# Patient Record
Sex: FEMALE | Race: WHITE | NOT HISPANIC OR LATINO | Employment: OTHER | URBAN - METROPOLITAN AREA
[De-identification: names, ages, dates, MRNs, and addresses within clinical notes are randomized per-mention and may not be internally consistent; named-entity substitution may affect disease eponyms.]

---

## 2018-03-21 DIAGNOSIS — F41.9 ANXIETY: ICD-10-CM

## 2018-03-21 RX ORDER — DIAZEPAM 5 MG/1
5 TABLET ORAL DAILY
Qty: 15 TABLET | Refills: 0 | Status: SHIPPED | OUTPATIENT
Start: 2018-03-21 | End: 2018-04-30 | Stop reason: SDUPTHER

## 2018-03-21 RX ORDER — ESCITALOPRAM OXALATE 10 MG/1
10 TABLET ORAL DAILY
Qty: 30 TABLET | Refills: 2 | Status: SHIPPED | OUTPATIENT
Start: 2018-03-21 | End: 2018-06-19 | Stop reason: SDUPTHER

## 2018-04-30 DIAGNOSIS — F41.9 ANXIETY: ICD-10-CM

## 2018-04-30 RX ORDER — DIAZEPAM 5 MG/1
5 TABLET ORAL DAILY
Qty: 30 TABLET | Refills: 3 | Status: SHIPPED | OUTPATIENT
Start: 2018-04-30 | End: 2019-05-22 | Stop reason: SDUPTHER

## 2018-06-19 DIAGNOSIS — F41.9 ANXIETY: ICD-10-CM

## 2018-06-19 RX ORDER — ESCITALOPRAM OXALATE 10 MG/1
TABLET ORAL
Qty: 30 TABLET | Refills: 5 | Status: SHIPPED | OUTPATIENT
Start: 2018-06-19 | End: 2018-12-12 | Stop reason: SDUPTHER

## 2018-12-12 DIAGNOSIS — F41.9 ANXIETY: Primary | ICD-10-CM

## 2018-12-12 RX ORDER — BUPROPION HYDROCHLORIDE 150 MG/1
150 TABLET ORAL DAILY
Qty: 30 TABLET | Refills: 5 | Status: SHIPPED | OUTPATIENT
Start: 2018-12-12 | End: 2019-05-22 | Stop reason: ALTCHOICE

## 2018-12-12 RX ORDER — ESCITALOPRAM OXALATE 10 MG/1
10 TABLET ORAL DAILY
Qty: 30 TABLET | Refills: 5 | Status: SHIPPED | OUTPATIENT
Start: 2018-12-12 | End: 2019-05-22 | Stop reason: ALTCHOICE

## 2019-05-22 DIAGNOSIS — F41.9 ANXIETY: ICD-10-CM

## 2019-05-22 RX ORDER — DIAZEPAM 5 MG/1
5 TABLET ORAL DAILY
Qty: 30 TABLET | Refills: 3 | Status: SHIPPED | OUTPATIENT
Start: 2019-05-22 | End: 2020-11-02

## 2019-06-20 ENCOUNTER — OFFICE VISIT (OUTPATIENT)
Dept: INTERNAL MEDICINE | Facility: CLINIC | Age: 49
End: 2019-06-20
Payer: COMMERCIAL

## 2019-06-20 VITALS
HEART RATE: 68 BPM | TEMPERATURE: 99 F | SYSTOLIC BLOOD PRESSURE: 148 MMHG | HEIGHT: 63 IN | OXYGEN SATURATION: 96 % | DIASTOLIC BLOOD PRESSURE: 88 MMHG | BODY MASS INDEX: 34.96 KG/M2 | WEIGHT: 197.31 LBS

## 2019-06-20 DIAGNOSIS — G47.9 SLEEPING DIFFICULTY: ICD-10-CM

## 2019-06-20 DIAGNOSIS — Z00.00 ENCOUNTER FOR HEALTH MAINTENANCE EXAMINATION: ICD-10-CM

## 2019-06-20 DIAGNOSIS — I10 ESSENTIAL HYPERTENSION: Primary | ICD-10-CM

## 2019-06-20 DIAGNOSIS — R06.83 SNORING: ICD-10-CM

## 2019-06-20 DIAGNOSIS — Z83.3 FAMILY HISTORY OF DIABETES MELLITUS: ICD-10-CM

## 2019-06-20 PROCEDURE — 3079F DIAST BP 80-89 MM HG: CPT | Mod: CPTII,S$GLB,, | Performed by: INTERNAL MEDICINE

## 2019-06-20 PROCEDURE — 99203 OFFICE O/P NEW LOW 30 MIN: CPT | Mod: S$GLB,,, | Performed by: INTERNAL MEDICINE

## 2019-06-20 PROCEDURE — 3008F PR BODY MASS INDEX (BMI) DOCUMENTED: ICD-10-PCS | Mod: CPTII,S$GLB,, | Performed by: INTERNAL MEDICINE

## 2019-06-20 PROCEDURE — 3077F SYST BP >= 140 MM HG: CPT | Mod: CPTII,S$GLB,, | Performed by: INTERNAL MEDICINE

## 2019-06-20 PROCEDURE — 99203 PR OFFICE/OUTPT VISIT, NEW, LEVL III, 30-44 MIN: ICD-10-PCS | Mod: S$GLB,,, | Performed by: INTERNAL MEDICINE

## 2019-06-20 PROCEDURE — 3077F PR MOST RECENT SYSTOLIC BLOOD PRESSURE >= 140 MM HG: ICD-10-PCS | Mod: CPTII,S$GLB,, | Performed by: INTERNAL MEDICINE

## 2019-06-20 PROCEDURE — 99999 PR PBB SHADOW E&M-EST. PATIENT-LVL IV: CPT | Mod: PBBFAC,,, | Performed by: INTERNAL MEDICINE

## 2019-06-20 PROCEDURE — 3079F PR MOST RECENT DIASTOLIC BLOOD PRESSURE 80-89 MM HG: ICD-10-PCS | Mod: CPTII,S$GLB,, | Performed by: INTERNAL MEDICINE

## 2019-06-20 PROCEDURE — 99999 PR PBB SHADOW E&M-EST. PATIENT-LVL IV: ICD-10-PCS | Mod: PBBFAC,,, | Performed by: INTERNAL MEDICINE

## 2019-06-20 PROCEDURE — 3008F BODY MASS INDEX DOCD: CPT | Mod: CPTII,S$GLB,, | Performed by: INTERNAL MEDICINE

## 2019-06-20 RX ORDER — AMLODIPINE BESYLATE 5 MG/1
5 TABLET ORAL DAILY
Qty: 30 TABLET | Refills: 1 | Status: SHIPPED | OUTPATIENT
Start: 2019-06-20 | End: 2019-07-12 | Stop reason: SDUPTHER

## 2019-06-20 NOTE — Clinical Note
Dr Mo - I saw Ms Valenzuela for an establish-care visit today; we discussed her sleep difficulties and stress as well as the diazepam. So that you're aware, I don't prescribe diazepam or other benzodiazepines for sleep; I recommended that if she feels she needs to continue this that she see you or another psychiatrist. It wasn't clear whether she had seen you for her own care. Thanks,Clemente Ku MD

## 2019-06-20 NOTE — PROGRESS NOTES
"Subjective:       Patient ID: Gege Valenzuela is a 48 y.o. female.    Chief Complaint: Establish Care    Hypertension   This is a chronic problem. The current episode started more than 1 year ago. The problem has been waxing and waning since onset. The problem is resistant. Pertinent negatives include no anxiety, blurred vision, chest pain, headaches, malaise/fatigue, neck pain, orthopnea, palpitations, peripheral edema, PND, shortness of breath or sweats. There are no associated agents to hypertension. Risk factors for coronary artery disease include obesity and stress. Past treatments include lifestyle changes. The current treatment provides no improvement. There are no compliance problems.      47 y/o woman here to establish care, primary concern is high BP.  Has been following with her OB/Gyn (Dr Stephani Noriega) for primary care generally, hasn't had PCP. Last saw Dr Ham for urgent visit in 2012.    HTN - reports BP has been to some degree high when at Gyn office for past 5 years. Recently started monitoring at home - 140-160s/90-100s since February  No headache, vision changes, chest pain, or dyspnea.  Did have gestational HTN    Mother and uncle with HTN  Uncle with prinzmetal's angina which she reports is allergy-related    Cooks at home, generally healthy foods, doesn't cook with salt, doesn't add salt to foods.   Generally not eating fast foods. Watches sodium level.   1 diet coke daily    Stress, difficulty sleeping - Stress related to helping son manage DM1, other family health issues, managing day-to-day.   Has seen / been prescribed medications by a psychiatrist (also by her Gyn?). Was on lexapro, switched briefly to wellbutrin, had significant dizziness with this, switched back, then tapered off the lexapro. Takes diazepam 5mg for sleep sometimes, not daily. Doesn't want to take a "sleeping pill".   Last rx for diazepam on [facility]PP[facility] from 5/22/19 from Dr Mo for #30, prior to this was 9/2018, " 7/2018, 4/2018, 3/2018.  Does have a glass or two of wine in evenings sometimes, not daily    Gets sleepy easily, does snore, light sleeper / wakes easily at night.   Usually able to fall asleep easily, getting back to sleep after waking can be difficult    Weight generally stable over past decade  Working with  on exercise, would like to lose weight  Doesn't feel that she would be able to track calories or make significant diet changes - already generally healthy diet    Following with Dr Stephani Noriega for Gyn - Pap done in 3/14/2019, was abnormal, f/u with colpo in 5/6/2019 which was benign. Planned for repeat pap at 6 months  Mammogram done 4/3/2019, normal, does these yearly    Paternal GM with breast cancer later in life  Half-sister (maternal) who had breast cancer (but has FHx on father's side)  No colon cancer in family    Review of Systems   Constitutional: Positive for fatigue (sleepy easily during the day). Negative for activity change, malaise/fatigue and unexpected weight change.   HENT: Negative.    Eyes: Negative for blurred vision and visual disturbance.   Respiratory: Negative for cough and shortness of breath.    Cardiovascular: Negative for chest pain, palpitations, orthopnea, leg swelling and PND.   Gastrointestinal: Negative.  Negative for abdominal pain and blood in stool.   Endocrine: Positive for heat intolerance (feels hot all the time (not new)).   Genitourinary: Negative.    Musculoskeletal: Negative for gait problem, joint swelling and neck pain.   Skin: Negative.    Neurological: Negative for weakness and headaches.   Psychiatric/Behavioral: Positive for sleep disturbance.        Stress         Past Medical History:   Diagnosis Date    Abnormal Pap smear of cervix 03/2019    colpo benign    Gestational hypertension     HTN (hypertension) 2019     Past Surgical History:   Procedure Laterality Date    acl replacement      ANTERIOR CRUCIATE LIGAMENT REPAIR      ARTHROSCOPY-KNEE  "WITH MEDIAL AND LATERAL REPAIR Right 2016    [facility]    ARTHROSCOPY-MENISCECTOMY Right 2016    [facility]     SECTION      2    DILATION AND CURETTAGE OF UTERUS      REPAIR ANTERIOR CRUCIATE LIGAMENT-ARTHROSCOPICALLY AIDED BTB ALLOGRAFT / PRP AUGMENTATION Right 2016    [facility]    REPAIR-MENISCUS MEDIAL AND LATERAL REPAIR Right 2016    [facility]     Family History   Adopted: Yes   Problem Relation Age of Onset    Hypertension Mother     Hypertension Father     Diabetes Father         DM2    Chromosomal disorder Daughter     Diabetes Son         DM1    Cancer Sister         breast cancer    Heart disease Paternal Uncle         prinzmetal's angina    Diabetes Paternal Uncle         DM2    Hypertension Paternal Uncle     COPD Maternal Grandmother     Cancer Paternal Grandmother         breast cancer (dx when older)    Diabetes Paternal Grandmother         DM2    COPD Paternal Grandfather     Stroke Paternal Grandfather        Social History     Tobacco Use    Smoking status: Former Smoker     Packs/day: 1.00     Years: 19.00     Pack years: 19.00     Last attempt to quit: 2006     Years since quittin.7    Smokeless tobacco: Never Used   Substance Use Topics    Alcohol use: Yes     Alcohol/week: 2.4 oz     Types: 4 Glasses of wine per week     Frequency: 4 or more times a week     Drinks per session: 1 or 2     Binge frequency: Less than monthly     Comment: occasional    Drug use: No       Medications and allergies reviewed.     Objective:          Vitals:    19 1052 19 1319   BP: (!) 154/90 (!) 148/88   BP Location: Left arm    Patient Position: Sitting    Pulse: 68    Temp: 98.9 °F (37.2 °C)    TempSrc: Oral    SpO2: 96%    Weight: 89.5 kg (197 lb 5 oz)    Height: 5' 3" (1.6 m)      Body mass index is 34.95 " kg/m².  Physical Exam   Constitutional: She is oriented to person, place, and time. She appears well-developed and well-nourished. No distress.   HENT:   Head: Normocephalic and atraumatic.   Mouth/Throat: Oropharynx is clear and moist.   Eyes: Conjunctivae and EOM are normal.   Neck: Neck supple.   Cardiovascular: Normal rate, regular rhythm and normal heart sounds.   No murmur heard.  Pulmonary/Chest: Effort normal and breath sounds normal. No respiratory distress.   Abdominal: Soft.   Musculoskeletal: She exhibits no edema or tenderness.   Neurological: She is alert and oriented to person, place, and time. No cranial nerve deficit. Gait normal.   Skin: Skin is warm and dry.   Psychiatric: She has a normal mood and affect.   Vitals reviewed.      Lab Results   Component Value Date    WBC 8.56 05/18/2016    HGB 14.3 05/18/2016    HCT 42.8 05/18/2016     05/18/2016    CHOL 157 08/26/2009    TRIG 136 08/26/2009    HDL 49 08/26/2009    ALT 30 08/26/2009    AST 21 08/26/2009     08/26/2009    K 3.4 (L) 08/26/2009     08/26/2009    CREATININE 0.7 08/26/2009    BUN 8 08/26/2009    CO2 28 08/26/2009    TSH 1.89 08/26/2009       Assessment:       1. Essential hypertension    2. Encounter for health maintenance examination    3. Snoring    4. Sleeping difficulty    5. Family history of diabetes mellitus        Plan:   Gege was seen today for establish care.    Diagnoses and all orders for this visit:    Essential hypertension - consistently above goal in HTN range on home cuff and also at visit today  Already following low-salt diet  May have element of sleep problem/JAM contributing but not interested in further eval for sleep apnea at present  Continue working on regular exercise  Starting norvasc - can start with 2.5mg x 1 week, monitor at home, go up to 5mg if not consistently <130/80.  F/u with nurse visit for home cuff calibration in a few weeks  -     amLODIPine (NORVASC) 5 MG tablet; Take 1  tablet (5 mg total) by mouth once daily.    Encounter for health maintenance examination - labs before next visit  -     CBC Without Differential; Future  -     Comprehensive metabolic panel; Future  -     Lipid panel; Future  -     TSH; Future  -     Vitamin D; Future    Snoring  Sleeping difficulty - as noted re: snoring. Discussed that I do not prescribe diazepam as a medication for sleep, but that if she feels she needs an ongoing prescription for this she can follow up with psychiatrist. She is not interested in another medication at present    Family history of diabetes mellitus  -     Hemoglobin A1c; Future    Health maintenance reviewed with patient.   Labs today (if insurance covers labs here; she will check)  Record request from her Gyn  RN visit for home cuff check in a few weeks  Follow up in about 3 months (around 9/20/2019) for hypertension, annual physical.    Clemente Ku MD  Internal Medicine  [facility]  6/20/2019

## 2019-06-20 NOTE — PATIENT INSTRUCTIONS
Blood Pressure Measurement:    -- Please record your blood pressure 3-5 times per week. When checking, make sure you have been sitting for about 5 minutes, your legs are uncrossed, and the blood pressure cuff at the level of your heart. Record your blood pressure with the date and time in a log and bring it with you to every doctor's visit.  -- Goal blood pressure is top number less than 130 and bottom number less than 80.  -- If your blood pressure is >160/>100 on two consecutive occasions, contact the office. If it is >180/>120 and you are having confusion, chest pain or back pain, shortness of breath, or blood in the urine, go to the emergency room immediately.

## 2019-06-24 ENCOUNTER — PATIENT MESSAGE (OUTPATIENT)
Dept: INTERNAL MEDICINE | Facility: CLINIC | Age: 49
End: 2019-06-24

## 2019-07-02 ENCOUNTER — PATIENT MESSAGE (OUTPATIENT)
Dept: INTERNAL MEDICINE | Facility: CLINIC | Age: 49
End: 2019-07-02

## 2019-07-02 DIAGNOSIS — F41.9 ANXIETY: Primary | ICD-10-CM

## 2019-07-02 DIAGNOSIS — R06.83 SNORING: ICD-10-CM

## 2019-07-02 DIAGNOSIS — Z83.3 FAMILY HISTORY OF DIABETES MELLITUS: ICD-10-CM

## 2019-07-02 DIAGNOSIS — E66.9 OBESITY (BMI 30.0-34.9): ICD-10-CM

## 2019-07-02 DIAGNOSIS — G47.9 SLEEP DIFFICULTIES: ICD-10-CM

## 2019-07-02 DIAGNOSIS — I10 ESSENTIAL HYPERTENSION: ICD-10-CM

## 2019-07-03 PROBLEM — Z83.3 FAMILY HISTORY OF DIABETES MELLITUS: Status: ACTIVE | Noted: 2019-07-03

## 2019-07-03 PROBLEM — G47.9 SLEEP DIFFICULTIES: Status: ACTIVE | Noted: 2019-07-03

## 2019-07-03 PROBLEM — E66.9 OBESITY (BMI 30.0-34.9): Status: ACTIVE | Noted: 2019-07-03

## 2019-07-03 PROBLEM — E66.811 OBESITY (BMI 30.0-34.9): Status: ACTIVE | Noted: 2019-07-03

## 2019-07-03 PROBLEM — I10 ESSENTIAL HYPERTENSION: Status: ACTIVE | Noted: 2019-07-03

## 2019-07-03 NOTE — TELEPHONE ENCOUNTER
Labs: CBC, CMP, lipid, TSH, vitamin D, A1c  Orders sent to [facility]  Let patient know

## 2019-07-10 LAB
25(OH)D3 SERPL-MCNC: 24 NG/ML (ref 30–100)
ALBUMIN SERPL-MCNC: 4.2 G/DL (ref 3.6–5.1)
ALBUMIN/GLOB SERPL: 1.6 (CALC) (ref 1–2.5)
ALP SERPL-CCNC: 70 U/L (ref 33–115)
ALT SERPL-CCNC: 14 U/L (ref 6–29)
AST SERPL-CCNC: 15 U/L (ref 10–35)
BILIRUB SERPL-MCNC: 0.7 MG/DL (ref 0.2–1.2)
BUN SERPL-MCNC: 14 MG/DL (ref 7–25)
BUN/CREAT SERPL: ABNORMAL (CALC) (ref 6–22)
CALCIUM SERPL-MCNC: 9.5 MG/DL (ref 8.6–10.2)
CHLORIDE SERPL-SCNC: 102 MMOL/L (ref 98–110)
CHOLEST SERPL-MCNC: 152 MG/DL
CHOLEST/HDLC SERPL: 2.7 (CALC)
CO2 SERPL-SCNC: 27 MMOL/L (ref 20–32)
CREAT SERPL-MCNC: 0.62 MG/DL (ref 0.5–1.1)
ERYTHROCYTE [DISTWIDTH] IN BLOOD BY AUTOMATED COUNT: 15.4 % (ref 11–15)
GFRSERPLBLD MDRD-ARVRAT: 106 ML/MIN/1.73M2
GLOBULIN SER CALC-MCNC: 2.7 G/DL (CALC) (ref 1.9–3.7)
GLUCOSE SERPL-MCNC: 207 MG/DL (ref 65–99)
HBA1C MFR BLD: 6.7 % OF TOTAL HGB
HCT VFR BLD AUTO: 45.9 % (ref 35–45)
HDLC SERPL-MCNC: 56 MG/DL
HGB BLD-MCNC: 15.1 G/DL (ref 11.7–15.5)
LDLC SERPL CALC-MCNC: 75 MG/DL (CALC)
MCH RBC QN AUTO: 27 PG (ref 27–33)
MCHC RBC AUTO-ENTMCNC: 32.9 G/DL (ref 32–36)
MCV RBC AUTO: 82.1 FL (ref 80–100)
NONHDLC SERPL-MCNC: 96 MG/DL (CALC)
PLATELET # BLD AUTO: 266 THOUSAND/UL (ref 140–400)
PMV BLD REES-ECKER: 11.8 FL (ref 7.5–12.5)
POTASSIUM SERPL-SCNC: 4.4 MMOL/L (ref 3.5–5.3)
PROT SERPL-MCNC: 6.9 G/DL (ref 6.1–8.1)
RBC # BLD AUTO: 5.59 MILLION/UL (ref 3.8–5.1)
SODIUM SERPL-SCNC: 137 MMOL/L (ref 135–146)
TRIGL SERPL-MCNC: 127 MG/DL
TSH SERPL-ACNC: 1.71 MIU/L
WBC # BLD AUTO: 7.6 THOUSAND/UL (ref 3.8–10.8)

## 2019-07-11 ENCOUNTER — CLINICAL SUPPORT (OUTPATIENT)
Dept: INTERNAL MEDICINE | Facility: CLINIC | Age: 49
End: 2019-07-11
Payer: COMMERCIAL

## 2019-07-11 ENCOUNTER — TELEPHONE (OUTPATIENT)
Dept: INTERNAL MEDICINE | Facility: CLINIC | Age: 49
End: 2019-07-11

## 2019-07-11 VITALS — HEART RATE: 88 BPM | DIASTOLIC BLOOD PRESSURE: 82 MMHG | SYSTOLIC BLOOD PRESSURE: 140 MMHG

## 2019-07-11 DIAGNOSIS — R73.09 ELEVATED HEMOGLOBIN A1C: ICD-10-CM

## 2019-07-11 DIAGNOSIS — I10 ESSENTIAL HYPERTENSION: Primary | ICD-10-CM

## 2019-07-11 PROCEDURE — 99999 PR PBB SHADOW E&M-EST. PATIENT-LVL I: ICD-10-PCS | Mod: PBBFAC,,,

## 2019-07-11 PROCEDURE — 99999 PR PBB SHADOW E&M-EST. PATIENT-LVL I: CPT | Mod: PBBFAC,,,

## 2019-07-11 RX ORDER — LOSARTAN POTASSIUM 25 MG/1
25 TABLET ORAL DAILY
Qty: 90 TABLET | Refills: 3 | Status: SHIPPED | OUTPATIENT
Start: 2019-07-11 | End: 2019-07-12

## 2019-07-11 NOTE — PROGRESS NOTES
BP: 140/82     P:88  Machine BP: 145/96 I watched pt take BP on her machine, everything was done correctly    has rx Losartan. Pt is to continue taking Amlodipine 7.5mg  Pt has an appt with  tomorrow at 10:30am 07/12/19

## 2019-07-11 NOTE — TELEPHONE ENCOUNTER
Patient here for nurse BP check / home cuff check. Has been taking norvasc 7.5mg which has been keeping her blood pressure generally in the 140s/80-90s.    Labs done recently with normal TSH, CMP normal except high blood glucose >200, and A1c is 6.7; consistent with new diagnosis of diabetes though will recheck to verify and also schedule patient for close follow up to discuss further.  Given this, will add losartan 25mg for better BP control.    LPN will schedule patient for close follow up for her elevated A1c.

## 2019-07-12 ENCOUNTER — TELEPHONE (OUTPATIENT)
Dept: INTERNAL MEDICINE | Facility: CLINIC | Age: 49
End: 2019-07-12

## 2019-07-12 ENCOUNTER — OFFICE VISIT (OUTPATIENT)
Dept: INTERNAL MEDICINE | Facility: CLINIC | Age: 49
End: 2019-07-12
Payer: COMMERCIAL

## 2019-07-12 VITALS
WEIGHT: 197.56 LBS | TEMPERATURE: 99 F | BODY MASS INDEX: 35 KG/M2 | DIASTOLIC BLOOD PRESSURE: 84 MMHG | OXYGEN SATURATION: 99 % | SYSTOLIC BLOOD PRESSURE: 136 MMHG | HEIGHT: 63 IN | HEART RATE: 79 BPM

## 2019-07-12 DIAGNOSIS — Z83.3 FAMILY HISTORY OF DIABETES MELLITUS: ICD-10-CM

## 2019-07-12 DIAGNOSIS — R29.818 SUSPECTED SLEEP APNEA: ICD-10-CM

## 2019-07-12 DIAGNOSIS — E11.9 TYPE 2 DIABETES MELLITUS WITHOUT COMPLICATION, WITHOUT LONG-TERM CURRENT USE OF INSULIN: Primary | ICD-10-CM

## 2019-07-12 DIAGNOSIS — E55.9 VITAMIN D DEFICIENCY: ICD-10-CM

## 2019-07-12 DIAGNOSIS — I10 ESSENTIAL HYPERTENSION: ICD-10-CM

## 2019-07-12 LAB
ALBUMIN/CREAT UR: 16 UG/MG (ref 0–30)
CREAT UR-MCNC: 238 MG/DL (ref 15–325)
MICROALBUMIN UR DL<=1MG/L-MCNC: 38 UG/ML

## 2019-07-12 PROCEDURE — 99214 PR OFFICE/OUTPT VISIT, EST, LEVL IV, 30-39 MIN: ICD-10-PCS | Mod: S$GLB,,, | Performed by: INTERNAL MEDICINE

## 2019-07-12 PROCEDURE — 99214 OFFICE O/P EST MOD 30 MIN: CPT | Mod: S$GLB,,, | Performed by: INTERNAL MEDICINE

## 2019-07-12 PROCEDURE — 3044F PR MOST RECENT HEMOGLOBIN A1C LEVEL <7.0%: ICD-10-PCS | Mod: CPTII,S$GLB,, | Performed by: INTERNAL MEDICINE

## 2019-07-12 PROCEDURE — 82043 UR ALBUMIN QUANTITATIVE: CPT

## 2019-07-12 PROCEDURE — 3044F HG A1C LEVEL LT 7.0%: CPT | Mod: CPTII,S$GLB,, | Performed by: INTERNAL MEDICINE

## 2019-07-12 PROCEDURE — 3008F PR BODY MASS INDEX (BMI) DOCUMENTED: ICD-10-PCS | Mod: CPTII,S$GLB,, | Performed by: INTERNAL MEDICINE

## 2019-07-12 PROCEDURE — 3079F DIAST BP 80-89 MM HG: CPT | Mod: CPTII,S$GLB,, | Performed by: INTERNAL MEDICINE

## 2019-07-12 PROCEDURE — 3075F SYST BP GE 130 - 139MM HG: CPT | Mod: CPTII,S$GLB,, | Performed by: INTERNAL MEDICINE

## 2019-07-12 PROCEDURE — 99999 PR PBB SHADOW E&M-EST. PATIENT-LVL III: ICD-10-PCS | Mod: PBBFAC,,, | Performed by: INTERNAL MEDICINE

## 2019-07-12 PROCEDURE — 3075F PR MOST RECENT SYSTOLIC BLOOD PRESS GE 130-139MM HG: ICD-10-PCS | Mod: CPTII,S$GLB,, | Performed by: INTERNAL MEDICINE

## 2019-07-12 PROCEDURE — 3079F PR MOST RECENT DIASTOLIC BLOOD PRESSURE 80-89 MM HG: ICD-10-PCS | Mod: CPTII,S$GLB,, | Performed by: INTERNAL MEDICINE

## 2019-07-12 PROCEDURE — 3008F BODY MASS INDEX DOCD: CPT | Mod: CPTII,S$GLB,, | Performed by: INTERNAL MEDICINE

## 2019-07-12 PROCEDURE — 99999 PR PBB SHADOW E&M-EST. PATIENT-LVL III: CPT | Mod: PBBFAC,,, | Performed by: INTERNAL MEDICINE

## 2019-07-12 RX ORDER — AMLODIPINE BESYLATE 10 MG/1
10 TABLET ORAL DAILY
Qty: 30 TABLET | Refills: 3 | Status: SHIPPED | OUTPATIENT
Start: 2019-07-12 | End: 2019-11-20 | Stop reason: SDUPTHER

## 2019-07-12 RX ORDER — INSULIN PUMP SYRINGE, 3 ML
EACH MISCELLANEOUS
Qty: 1 EACH | Refills: 0 | Status: SHIPPED | OUTPATIENT
Start: 2019-07-12 | End: 2024-01-10

## 2019-07-12 RX ORDER — LANCETS
1 EACH MISCELLANEOUS 2 TIMES DAILY WITH MEALS
Qty: 100 EACH | Refills: 3 | Status: SHIPPED | OUTPATIENT
Start: 2019-07-12 | End: 2021-10-08 | Stop reason: SDUPTHER

## 2019-07-12 RX ORDER — LANCING DEVICE
1 EACH MISCELLANEOUS 2 TIMES DAILY WITH MEALS
Qty: 1 EACH | Refills: 0 | Status: SHIPPED | OUTPATIENT
Start: 2019-07-12 | End: 2021-10-08

## 2019-07-12 NOTE — Clinical Note
Philomena - this patient has new dx diabetes, says she has had eye exam recently with Dr Benton. Can you help get the record and make sure she was checked then for retinopathy?Thanks!Clemente Ku MD

## 2019-07-12 NOTE — PROGRESS NOTES
Subjective:       Patient ID: Gege Valenzuela is a 48 y.o. female.    Chief Complaint: Follow-up    HPI  47 y/o woman with HTN here for close follow up and lab review - new dx diabetes.  Recently established care    Labs 7/9 with BG >200 (nonfasting), A1c 6.7  Checked fasting BG this AM - 154  New diagnosis of diabetes. Feeling somewhat overwhelmed with this.  Would like to try to get strips to match a meter she already has at home if this is covered by insurance  Cares for son with DM1 - doesn't feel she would benefit from diabetes education class.   Would like to work on getting BG under better control with diet, exercise, weight loss rather than starting medication at present.    HTN - started on amlodipine 5mg at last visit. Doesn't want to start ARB if possible.    Suspected JAM - Has been sleeping propped up on pillows, feels that she is sleeping more deeply. No longer snoring as much with this.     Review of Systems   Constitutional: Positive for fatigue (sleepy easily during the day). Negative for activity change and unexpected weight change.   HENT: Negative.    Eyes: Negative for visual disturbance.   Respiratory: Negative for cough and shortness of breath.    Cardiovascular: Negative for chest pain and leg swelling.   Gastrointestinal: Negative.  Negative for abdominal pain and blood in stool.   Endocrine: Positive for heat intolerance (feels hot all the time (not new)). Negative for polyuria.   Genitourinary: Negative.    Musculoskeletal: Negative for gait problem and joint swelling.   Skin: Negative.    Neurological: Negative for weakness and headaches.   Psychiatric/Behavioral: Positive for sleep disturbance. The patient is nervous/anxious.         Stress         Past medical history, surgical history, and family medical history reviewed and updated as appropriate.    Medications and allergies reviewed.     Objective:          Vitals:    07/12/19 1014   BP: 136/84   BP Location: Left arm   Patient  "Position: Sitting   Pulse: 79   Temp: 99.3 °F (37.4 °C)   TempSrc: Oral   SpO2: 99%   Weight: 89.6 kg (197 lb 8.5 oz)   Height: 5' 3" (1.6 m)     Body mass index is 34.99 kg/m².  Physical Exam   Constitutional: She is oriented to person, place, and time. She appears well-developed and well-nourished. No distress.   HENT:   Head: Normocephalic and atraumatic.   Mouth/Throat: Oropharynx is clear and moist.   Eyes: Conjunctivae and EOM are normal.   Neck: Neck supple.   Cardiovascular: Normal rate, regular rhythm and normal heart sounds.   No murmur heard.  Pulmonary/Chest: Effort normal and breath sounds normal. No respiratory distress.   Abdominal: Soft.   Musculoskeletal: She exhibits no edema.   Neurological: She is alert and oriented to person, place, and time. No cranial nerve deficit. Gait normal.   Skin: Skin is warm and dry.   Psychiatric: She has a normal mood and affect.   Stress related to recent diagnosis   Vitals reviewed.    Protective Sensation (w/ 10 gram monofilament):  Right: Intact  Left: Intact    Visual Inspection:  Normal -  Bilateral    Pedal Pulses:   Right: Present  Left: Present    Posterior tibialis:   Right:Present  Left: Present      Lab Results   Component Value Date    WBC 7.6 07/09/2019    HGB 15.1 07/09/2019    HCT 45.9 (H) 07/09/2019     07/09/2019    CHOL 152 07/09/2019    TRIG 127 07/09/2019    HDL 56 07/09/2019    ALT 14 07/09/2019    AST 15 07/09/2019     07/09/2019    K 4.4 07/09/2019     07/09/2019    CREATININE 0.62 07/09/2019    BUN 14 07/09/2019    CO2 27 07/09/2019    TSH 1.71 07/09/2019    HGBA1C 6.7 (H) 07/09/2019       Assessment:       1. Type 2 diabetes mellitus without complication, without long-term current use of insulin    2. Family history of diabetes mellitus    3. Essential hypertension    4. Vitamin D deficiency    5. Suspected sleep apnea        Plan:   Gege was seen today for follow-up.    Diagnoses and all orders for this visit:    Type 2 " diabetes mellitus without complication, without long-term current use of insulin - reviewed diagnosis, goals for control of diabetes  Discussed options - metformin + diet/lifestyle change vs starting with diet/lifestyle change first. She wants to try getting this under control without medication for now.  Rx given for testing supplies  Checking MA/C  -     CBC Without Differential; Future  -     Basic metabolic panel; Future  -     Hemoglobin A1c; Future  -     CBC Without Differential  -     Basic metabolic panel  -     Hemoglobin A1c  -     blood-glucose meter kit; Use as instructed. Check once daily  -     blood sugar diagnostic Strp; 1 strip by Misc.(Non-Drug; Combo Route) route 2 (two) times daily with meals.  -     lancing device Misc; 1 Device by Misc.(Non-Drug; Combo Route) route 2 (two) times daily with meals.  -     lancets Misc; 1 lancet by Misc.(Non-Drug; Combo Route) route 2 (two) times daily with meals.  -     Microalbumin/creatinine urine ratio; Future  -     MICROALBUMIN / CREATININE RATIO URINE    Family history of diabetes mellitus    Essential hypertension - norvasc up to 10mg, BP improved  Recommended trial of switch to ARB; she does not want to do this out of concern for adverse effects  Does feel that norvasc increases frequent movements during sleep but overall is able to tolerate this  -     amLODIPine (NORVASC) 10 MG tablet; Take 1 tablet (10 mg total) by mouth once daily.  -     Basic metabolic panel; Future  -     Basic metabolic panel    Vitamin D deficiency - recommend 1000-2000IU daily  -     Vitamin D; Future  -     Vitamin D    Suspected sleep apnea - reviewed; given symptoms recommend sleep study which she defers for now. She is working on weight loss as well as ergonomic changes for sleep position    Health maintenance reviewed with patient.   Record from last eye exam requested  Urine test today  Other labs in 3 months at outside lab per her request  Follow up in about 3 months  (around 10/12/2019) for diabetes, hypertension.    Clemente Ku MD  Internal Medicine  [facility]  7/12/2019

## 2019-07-12 NOTE — TELEPHONE ENCOUNTER
----- Message from Odalys Zurita sent at 7/12/2019 11:52 AM CDT -----  Patient joy johnsrobert [phone], urine needs the collection step to be done in [facility].  Please advise   Lab [id number]

## 2019-07-12 NOTE — TELEPHONE ENCOUNTER
----- Message from Shaji Martinez sent at 7/12/2019  3:48 PM CDT -----  Regarding: urine orders  We have a urine in lab for patient Gege Valenzuela ([id number]) with invalid orders. If correct orders aren't placed today we will be unable to run this patient's test and the urine will have to be recollected.

## 2019-07-16 PROBLEM — R29.818 SUSPECTED SLEEP APNEA: Status: ACTIVE | Noted: 2019-07-03

## 2019-08-26 DIAGNOSIS — Z12.11 COLON CANCER SCREENING: ICD-10-CM

## 2019-09-23 ENCOUNTER — PATIENT MESSAGE (OUTPATIENT)
Dept: INTERNAL MEDICINE | Facility: CLINIC | Age: 49
End: 2019-09-23

## 2019-09-23 DIAGNOSIS — E11.9 TYPE 2 DIABETES MELLITUS WITHOUT COMPLICATION, WITHOUT LONG-TERM CURRENT USE OF INSULIN: Primary | ICD-10-CM

## 2019-09-24 PROBLEM — E11.9 TYPE 2 DIABETES MELLITUS WITHOUT COMPLICATION, WITHOUT LONG-TERM CURRENT USE OF INSULIN: Status: ACTIVE | Noted: 2019-09-24

## 2019-09-24 NOTE — TELEPHONE ENCOUNTER
The orders that are currently in for [facility] are CBC, BMP, A1c, Vitamin D.   There are two orders that have been processed in through bulk order and thus went to [facility] rather than [facility] (FIT and microalbumin/creatinine). Will re-order the microalbumin/creatinine urine test through [facility].  We will check in re: colon cancer screening at her visit.    Please let her know.

## 2019-09-26 LAB
ALBUMIN/CREAT UR: 22 MCG/MG CREAT
CREAT UR-MCNC: 104 MG/DL (ref 20–275)
MICROALBUMIN UR-MCNC: 2.3 MG/DL

## 2019-10-02 ENCOUNTER — OFFICE VISIT (OUTPATIENT)
Dept: INTERNAL MEDICINE | Facility: CLINIC | Age: 49
End: 2019-10-02
Payer: COMMERCIAL

## 2019-10-02 VITALS
SYSTOLIC BLOOD PRESSURE: 144 MMHG | TEMPERATURE: 98 F | BODY MASS INDEX: 34.25 KG/M2 | HEART RATE: 77 BPM | DIASTOLIC BLOOD PRESSURE: 78 MMHG | WEIGHT: 193.31 LBS | HEIGHT: 63 IN | OXYGEN SATURATION: 95 %

## 2019-10-02 DIAGNOSIS — E11.9 TYPE 2 DIABETES MELLITUS WITHOUT COMPLICATION, WITHOUT LONG-TERM CURRENT USE OF INSULIN: Primary | ICD-10-CM

## 2019-10-02 DIAGNOSIS — F41.9 ANXIETY: ICD-10-CM

## 2019-10-02 PROCEDURE — 99999 PR PBB SHADOW E&M-EST. PATIENT-LVL III: ICD-10-PCS | Mod: PBBFAC,,, | Performed by: INTERNAL MEDICINE

## 2019-10-02 PROCEDURE — 99499 NO LOS: ICD-10-PCS | Mod: S$GLB,,, | Performed by: INTERNAL MEDICINE

## 2019-10-02 PROCEDURE — 99499 UNLISTED E&M SERVICE: CPT | Mod: S$GLB,,, | Performed by: INTERNAL MEDICINE

## 2019-10-02 PROCEDURE — 99999 PR PBB SHADOW E&M-EST. PATIENT-LVL III: CPT | Mod: PBBFAC,,, | Performed by: INTERNAL MEDICINE

## 2019-10-14 ENCOUNTER — PATIENT MESSAGE (OUTPATIENT)
Dept: INTERNAL MEDICINE | Facility: CLINIC | Age: 49
End: 2019-10-14

## 2019-10-14 DIAGNOSIS — E13.9 DIABETES MELLITUS DUE TO ABNORMAL INSULIN: Primary | ICD-10-CM

## 2019-10-28 ENCOUNTER — PATIENT OUTREACH (OUTPATIENT)
Dept: ADMINISTRATIVE | Facility: HOSPITAL | Age: 49
End: 2019-10-28

## 2019-10-28 DIAGNOSIS — F41.9 ANXIETY: Primary | ICD-10-CM

## 2019-10-28 RX ORDER — ESCITALOPRAM OXALATE 10 MG/1
TABLET ORAL
Refills: 2 | COMMUNITY
Start: 2019-08-29 | End: 2019-10-28 | Stop reason: SDUPTHER

## 2019-10-28 RX ORDER — ESCITALOPRAM OXALATE 5 MG/1
5 TABLET ORAL DAILY
Qty: 30 TABLET | Refills: 11 | Status: SHIPPED | OUTPATIENT
Start: 2019-10-28 | End: 2020-06-12 | Stop reason: SDUPTHER

## 2019-11-08 LAB — HBA1C MFR BLD: 6.6 % OF TOTAL HGB

## 2019-11-09 NOTE — PROGRESS NOTES
Chief Complaint: Follow up of blood pressure, blood sugar, mood disorder    HPI:This is a 49 year old woman who presents for follow up of above. She has been taking amlodipine 10 mg daily in the evening.  No leg swelling, chest pain, shortness of breath.   She started on BP medication in . She does not check her BP regularly at home.      She continues to be tired. She does not sleep well.  She falls asleep without a problem. Once she is awake she will toss and turn.  She will take diazepam 5 mg at bedtime if she knows she cannot sleep (very infrequent). She has restless legs that keeps her awake at night.     She started on lexapro 3/2018. She was at 10 mg daily and was able to lower to 5 mg daily with control of her mood.     Periods are ligther - she had a uterine fibroid embolization several years ago due to heavy periods.     She had a colonoscopy at [facility] 3-5 years ago due to hemorrhoid issues.     She is working on diet and exercise for her blood sugars- has lost 6 pounds.     REVIEW OF SYSTEMS: She denies fevers, chills, night sweats, visual change, hearing loss, sinus congestion, sore throat, chest pain, shortness of breath, nausea, vomiting, constipation, diarrhea, dysuria, hematuria, polydipsia, polyuria, joint pain, muscle pain, headaches,     Nearsighteded.      Hot at night    Past Medical History:   Diagnosis Date    Abnormal Pap smear of cervix 2019    colpo benign    Gestational hypertension     HTN (hypertension)      Past Surgical History:   Procedure Laterality Date    acl replacement Right     x 2    ANTERIOR CRUCIATE LIGAMENT REPAIR Right      SECTION      2    COLPOSCOPY  2019    Stephani Noriega MD    DILATION AND CURETTAGE OF UTERUS      UTERINE FIBROID EMBOLIZATION       Social History     Socioeconomic History    Marital status:      Spouse name: Not on file    Number of children: Not on file    Years of education: Not on file    Highest  education level: Not on file   Occupational History    Not on file   Social Needs    Financial resource strain: Hard    Food insecurity:     Worry: Sometimes true     Inability: Sometimes true    Transportation needs:     Medical: No     Non-medical: No   Tobacco Use    Smoking status: Former Smoker     Packs/day: 1.00     Years: 19.00     Pack years: 19.00     Last attempt to quit: 2006     Years since quittin.1    Smokeless tobacco: Never Used   Substance and Sexual Activity    Alcohol use: Yes     Alcohol/week: 4.0 standard drinks     Types: 4 Glasses of wine per week     Comment: one drink 5 times a week    Drug use: No    Sexual activity: Yes     Partners: Male     Comment: , 2 kids   Lifestyle    Physical activity:     Days per week: 2 days     Minutes per session: 40 min    Stress: Very much   Relationships    Social connections:     Talks on phone: Three times a week     Gets together: Once a week     Attends Christian service: Not on file     Active member of club or organization: Yes     Attends meetings of clubs or organizations: More than 4 times per year     Relationship status:    Other Topics Concern    Not on file   Social History Narrative    Lives with  and children. Son with DM1, daughter with chromasomal disorder.    Working with  on increasing exercise    Generally very healthy diet, low-carb    Adopted         Family Status   Relation Name Status    Mother Rosemary Aguilar Alive    Father Curtis Trotter Alive    Brother half sib Alive    Son Vikas Alive    Sister half sib Alive    MGM Eden Aguilar (Not Specified)    PGM Corine Trotter (Not Specified)    PGF Bubba Trotter (Not Specified)    Daughter Amanda Alive    Pat Uncle Alejandro Trotter (Not Specified)         Meds and allergies: updated on EPIC    BP (!) 144/90 (BP Location: Right arm, Patient Position: Sitting, BP Method: Large (Manual))   Pulse 68   Temp 98.2  "°F (36.8 °C) (Oral)   Ht 5' 3" (1.6 m)   Wt 87.9 kg (193 lb 12.6 oz)   LMP 11/02/2019   SpO2 98%   BMI 34.33 kg/m²     General: alert, oriented x 3, no apparent distress.  Affect normal  HEENT: Conjunctivae: anicteric, PERRL, EOMI, TM clear, Oralpharynx clear  Neck: supple, no thyroid enlargement, no cervical lymphadenopathy  Resp: effort normal, lungs clear bilaterally  CV: Regular rate and rhythm without murmurs, gallops or rubs, no lower extremity edema,     Assessment/Plan:  1. Hypertension - does not want more medication. Discussed lifestyle modification. She will monitor her BP at home  2. Elevated blood sugars - working on diet and exercise and weight loss.   3. Mood disorder- stable on lexapro 5 mg daily  4. Insomnia and restless legs- try vitron C one tablet daily. Could be iron deficient. Try Co Enzyme Q 10 200 mg daily. Discussed diet tonic water  5. Obesity - discussed diet and exercise.   I will see her back in 4 months, Sooner if problems arise  "

## 2019-11-11 ENCOUNTER — OFFICE VISIT (OUTPATIENT)
Dept: INTERNAL MEDICINE | Facility: CLINIC | Age: 49
End: 2019-11-11
Payer: COMMERCIAL

## 2019-11-11 VITALS
OXYGEN SATURATION: 98 % | SYSTOLIC BLOOD PRESSURE: 144 MMHG | TEMPERATURE: 98 F | HEIGHT: 63 IN | WEIGHT: 193.81 LBS | DIASTOLIC BLOOD PRESSURE: 90 MMHG | HEART RATE: 68 BPM | BODY MASS INDEX: 34.34 KG/M2

## 2019-11-11 DIAGNOSIS — I10 ESSENTIAL HYPERTENSION: Primary | ICD-10-CM

## 2019-11-11 DIAGNOSIS — R73.9 ELEVATED BLOOD SUGAR: ICD-10-CM

## 2019-11-11 DIAGNOSIS — F41.9 ANXIETY: ICD-10-CM

## 2019-11-11 DIAGNOSIS — E66.9 OBESITY (BMI 30.0-34.9): ICD-10-CM

## 2019-11-11 PROBLEM — E11.9 TYPE 2 DIABETES MELLITUS WITHOUT COMPLICATION, WITHOUT LONG-TERM CURRENT USE OF INSULIN: Status: RESOLVED | Noted: 2019-09-24 | Resolved: 2019-11-11

## 2019-11-11 PROCEDURE — 99214 OFFICE O/P EST MOD 30 MIN: CPT | Mod: S$GLB,,, | Performed by: INTERNAL MEDICINE

## 2019-11-11 PROCEDURE — 99999 PR PBB SHADOW E&M-EST. PATIENT-LVL III: CPT | Mod: PBBFAC,,, | Performed by: INTERNAL MEDICINE

## 2019-11-11 PROCEDURE — 3008F PR BODY MASS INDEX (BMI) DOCUMENTED: ICD-10-PCS | Mod: CPTII,S$GLB,, | Performed by: INTERNAL MEDICINE

## 2019-11-11 PROCEDURE — 99214 PR OFFICE/OUTPT VISIT, EST, LEVL IV, 30-39 MIN: ICD-10-PCS | Mod: S$GLB,,, | Performed by: INTERNAL MEDICINE

## 2019-11-11 PROCEDURE — 3077F PR MOST RECENT SYSTOLIC BLOOD PRESSURE >= 140 MM HG: ICD-10-PCS | Mod: CPTII,S$GLB,, | Performed by: INTERNAL MEDICINE

## 2019-11-11 PROCEDURE — 3080F PR MOST RECENT DIASTOLIC BLOOD PRESSURE >= 90 MM HG: ICD-10-PCS | Mod: CPTII,S$GLB,, | Performed by: INTERNAL MEDICINE

## 2019-11-11 PROCEDURE — 3077F SYST BP >= 140 MM HG: CPT | Mod: CPTII,S$GLB,, | Performed by: INTERNAL MEDICINE

## 2019-11-11 PROCEDURE — 3008F BODY MASS INDEX DOCD: CPT | Mod: CPTII,S$GLB,, | Performed by: INTERNAL MEDICINE

## 2019-11-11 PROCEDURE — 3080F DIAST BP >= 90 MM HG: CPT | Mod: CPTII,S$GLB,, | Performed by: INTERNAL MEDICINE

## 2019-11-11 PROCEDURE — 99999 PR PBB SHADOW E&M-EST. PATIENT-LVL III: ICD-10-PCS | Mod: PBBFAC,,, | Performed by: INTERNAL MEDICINE

## 2019-11-20 DIAGNOSIS — I10 ESSENTIAL HYPERTENSION: ICD-10-CM

## 2019-11-20 RX ORDER — AMLODIPINE BESYLATE 10 MG/1
TABLET ORAL
Qty: 30 TABLET | Refills: 11 | Status: SHIPPED | OUTPATIENT
Start: 2019-11-20 | End: 2020-12-22

## 2020-02-21 DIAGNOSIS — E11.9 TYPE 2 DIABETES MELLITUS WITHOUT COMPLICATION, UNSPECIFIED WHETHER LONG TERM INSULIN USE: ICD-10-CM

## 2020-02-26 ENCOUNTER — OFFICE VISIT (OUTPATIENT)
Dept: URGENT CARE | Facility: CLINIC | Age: 50
End: 2020-02-26
Payer: COMMERCIAL

## 2020-02-26 VITALS
TEMPERATURE: 99 F | OXYGEN SATURATION: 95 % | HEART RATE: 82 BPM | SYSTOLIC BLOOD PRESSURE: 123 MMHG | BODY MASS INDEX: 34.2 KG/M2 | HEIGHT: 63 IN | DIASTOLIC BLOOD PRESSURE: 79 MMHG | WEIGHT: 193 LBS

## 2020-02-26 DIAGNOSIS — M79.10 MUSCLE ACHE: ICD-10-CM

## 2020-02-26 DIAGNOSIS — J02.9 SORE THROAT: ICD-10-CM

## 2020-02-26 DIAGNOSIS — J10.1 INFLUENZA A: Primary | ICD-10-CM

## 2020-02-26 LAB
CTP QC/QA: YES
CTP QC/QA: YES
FLUAV AG NPH QL: POSITIVE
FLUBV AG NPH QL: NEGATIVE
MOLECULAR STREP A: NEGATIVE

## 2020-02-26 PROCEDURE — 87804 INFLUENZA ASSAY W/OPTIC: CPT | Mod: QW,S$GLB,, | Performed by: NURSE PRACTITIONER

## 2020-02-26 PROCEDURE — 87651 STREP A DNA AMP PROBE: CPT | Mod: QW,S$GLB,, | Performed by: NURSE PRACTITIONER

## 2020-02-26 PROCEDURE — 99214 OFFICE O/P EST MOD 30 MIN: CPT | Mod: 25,S$GLB,, | Performed by: NURSE PRACTITIONER

## 2020-02-26 PROCEDURE — 99214 PR OFFICE/OUTPT VISIT, EST, LEVL IV, 30-39 MIN: ICD-10-PCS | Mod: 25,S$GLB,, | Performed by: NURSE PRACTITIONER

## 2020-02-26 PROCEDURE — 87804 POCT INFLUENZA A/B: ICD-10-PCS | Mod: QW,S$GLB,, | Performed by: NURSE PRACTITIONER

## 2020-02-26 PROCEDURE — 87651 POCT STREP A MOLECULAR: ICD-10-PCS | Mod: QW,S$GLB,, | Performed by: NURSE PRACTITIONER

## 2020-02-26 RX ORDER — OSELTAMIVIR PHOSPHATE 75 MG/1
75 CAPSULE ORAL 2 TIMES DAILY
Qty: 10 CAPSULE | Refills: 0 | Status: SHIPPED | OUTPATIENT
Start: 2020-02-26 | End: 2020-03-02

## 2020-02-26 NOTE — PROGRESS NOTES
"Subjective:       Patient ID: Gege Valenzuela is a 49 y.o. female.    Vitals:  height is 5' 3" (1.6 m) and weight is 87.5 kg (193 lb). Her oral temperature is 99 °F (37.2 °C). Her blood pressure is 123/79 and her pulse is 82. Her oxygen saturation is 95%.     Chief Complaint: URI    URI    This is a new problem. The current episode started in the past 7 days (Monday). The problem has been gradually worsening. The maximum temperature recorded prior to her arrival was 102 - 102.9 F. The fever has been present for 3 to 4 days. Associated symptoms include congestion, coughing, headaches, rhinorrhea and a sore throat. Pertinent negatives include no abdominal pain, chest pain, diarrhea, dysuria, ear pain, joint pain, joint swelling, nausea, neck pain, plugged ear sensation, rash, sinus pain, sneezing, swollen glands, vomiting or wheezing. She has tried NSAIDs (Prescription cough medication) for the symptoms. The treatment provided mild relief.       Constitution: Negative for chills, sweating, fatigue and fever.   HENT: Positive for congestion and sore throat. Negative for ear pain, sinus pain, sinus pressure and voice change.    Neck: Negative for neck pain and painful lymph nodes.   Cardiovascular: Negative for chest pain.   Eyes: Negative for eye redness.   Respiratory: Positive for cough. Negative for chest tightness, sputum production, bloody sputum, COPD, shortness of breath, stridor, wheezing and asthma.    Gastrointestinal: Negative for abdominal pain, nausea, vomiting and diarrhea.   Genitourinary: Negative for dysuria.   Musculoskeletal: Negative for muscle ache.   Skin: Negative for rash.   Allergic/Immunologic: Negative for seasonal allergies, asthma and sneezing.   Neurological: Positive for headaches.   Hematologic/Lymphatic: Negative for swollen lymph nodes.       Objective:      Physical Exam   Constitutional: She is oriented to person, place, and time. Vital signs are normal. She appears well-developed " and well-nourished. She is cooperative.  Non-toxic appearance. She does not have a sickly appearance. She does not appear ill. No distress.   HENT:   Head: Normocephalic and atraumatic.   Right Ear: Hearing, tympanic membrane, external ear and ear canal normal.   Left Ear: Hearing, tympanic membrane, external ear and ear canal normal.   Nose: Nose normal. No mucosal edema, rhinorrhea or nasal deformity. No epistaxis. Right sinus exhibits no maxillary sinus tenderness and no frontal sinus tenderness. Left sinus exhibits no maxillary sinus tenderness and no frontal sinus tenderness.   Mouth/Throat: Uvula is midline, oropharynx is clear and moist and mucous membranes are normal. No trismus in the jaw. Normal dentition. No uvula swelling. No oropharyngeal exudate, posterior oropharyngeal edema or posterior oropharyngeal erythema.   Erythematous pharynx, no exudate, no lesion, uvula midline.   Eyes: Conjunctivae and lids are normal. No scleral icterus.   Neck: Trachea normal, full passive range of motion without pain and phonation normal. Neck supple. No neck rigidity. No edema and no erythema present.   Cardiovascular: Normal rate, regular rhythm, normal heart sounds, intact distal pulses and normal pulses.   Pulmonary/Chest: Effort normal and breath sounds normal. No respiratory distress. She has no decreased breath sounds. She has no rhonchi.   Abdominal: Soft. Normal appearance and bowel sounds are normal. She exhibits no distension and no mass. There is no tenderness. There is no rebound and no guarding. No hernia.   Muscle aches around abdomen   Musculoskeletal: Normal range of motion. She exhibits no edema or deformity.   Neurological: She is alert and oriented to person, place, and time. She exhibits normal muscle tone. Coordination normal.   Skin: Skin is warm, dry, intact, not diaphoretic and not pale.   Psychiatric: She has a normal mood and affect. Her speech is normal and behavior is normal. Judgment and  thought content normal. Cognition and memory are normal.   Nursing note and vitals reviewed.        Results for orders placed or performed in visit on 02/26/20   POCT Strep A, Molecular   Result Value Ref Range    Molecular Strep A, POC Negative Negative     Acceptable Yes    POCT Influenza A/B   Result Value Ref Range    Rapid Influenza A Ag Positive (A) Negative    Rapid Influenza B Ag Negative Negative     Acceptable Yes      Assessment:       1. Influenza A    2. Sore throat    3. Muscle ache        Plan:       Reviewed and discussed influenza results.   Influenza A  -     oseltamivir (TAMIFLU) 75 MG capsule; Take 1 capsule (75 mg total) by mouth 2 (two) times daily. for 5 days  Dispense: 10 capsule; Refill: 0    Sore throat  -     POCT Strep A, Molecular    Muscle ache  -     POCT Influenza A/B          Patient Instructions   You have been diagnosed with Influenza.   You are contagious for 24 hours after your last fever.  Please drink plenty of fluids.  Please get plenty of rest.  Please return here or go to the Emergency Department for any concerns or worsening of condition.  Tamiflu prescription has been discussed and if prescribed, please take to completion unless you cannot tolerate the side effects.   If you were prescribed a narcotic medication, do not drive or operate heavy equipment or machinery while taking these medications.  If you were given a steroid shot in the clinic and have also been given a prescription for a steroid such as Prednisone or a Medrol Dose Pack, please begin taking them tomorrow.  Take tylenol (acetominophen) for fever, chills or body aches every 4 hours. do not exceed 4000 mg/ day.  Take Motrin (Ibuprofen) every 4 hours for fever, chills, pain or inflammation.  Use an antihistmine such as claritin or zyrtec to dry you out. Use pseudoephedrine (behind the counter) to decongest (beware this can raise your blood pressure). Use mucinex (guaifenisin) to  break up mucous

## 2020-02-26 NOTE — PATIENT INSTRUCTIONS
You have been diagnosed with Influenza.   You are contagious for 24 hours after your last fever.  Please drink plenty of fluids.  Please get plenty of rest.  Please return here or go to the Emergency Department for any concerns or worsening of condition.  Tamiflu prescription has been discussed and if prescribed, please take to completion unless you cannot tolerate the side effects.   If you were prescribed a narcotic medication, do not drive or operate heavy equipment or machinery while taking these medications.  If you were given a steroid shot in the clinic and have also been given a prescription for a steroid such as Prednisone or a Medrol Dose Pack, please begin taking them tomorrow.  Take tylenol (acetominophen) for fever, chills or body aches every 4 hours. do not exceed 4000 mg/ day.  Take Motrin (Ibuprofen) every 4 hours for fever, chills, pain or inflammation.  Use an antihistmine such as claritin or zyrtec to dry you out. Use pseudoephedrine (behind the counter) to decongest (beware this can raise your blood pressure). Use mucinex (guaifenisin) to break up mucous

## 2020-02-27 ENCOUNTER — PATIENT OUTREACH (OUTPATIENT)
Dept: ADMINISTRATIVE | Facility: HOSPITAL | Age: 50
End: 2020-02-27

## 2020-03-09 ENCOUNTER — PATIENT MESSAGE (OUTPATIENT)
Dept: INTERNAL MEDICINE | Facility: CLINIC | Age: 50
End: 2020-03-09

## 2020-03-09 DIAGNOSIS — E13.9 DIABETES MELLITUS DUE TO ABNORMAL INSULIN: Primary | ICD-10-CM

## 2020-03-12 LAB
ALBUMIN SERPL-MCNC: 4.1 G/DL (ref 3.6–5.1)
ALBUMIN/GLOB SERPL: 1.9 (CALC) (ref 1–2.5)
ALP SERPL-CCNC: 74 U/L (ref 37–153)
ALT SERPL-CCNC: 19 U/L (ref 6–29)
AST SERPL-CCNC: 18 U/L (ref 10–35)
BILIRUB SERPL-MCNC: 0.7 MG/DL (ref 0.2–1.2)
BUN SERPL-MCNC: 12 MG/DL (ref 7–25)
BUN/CREAT SERPL: ABNORMAL (CALC) (ref 6–22)
CALCIUM SERPL-MCNC: 8.9 MG/DL (ref 8.6–10.4)
CHLORIDE SERPL-SCNC: 102 MMOL/L (ref 98–110)
CO2 SERPL-SCNC: 30 MMOL/L (ref 20–32)
CREAT SERPL-MCNC: 0.6 MG/DL (ref 0.5–1.05)
GFRSERPLBLD MDRD-ARVRAT: 106 ML/MIN/1.73M2
GLOBULIN SER CALC-MCNC: 2.2 G/DL (CALC) (ref 1.9–3.7)
GLUCOSE SERPL-MCNC: 119 MG/DL (ref 65–99)
HBA1C MFR BLD: 7.8 % OF TOTAL HGB
POTASSIUM SERPL-SCNC: 4.1 MMOL/L (ref 3.5–5.3)
PROT SERPL-MCNC: 6.3 G/DL (ref 6.1–8.1)
SODIUM SERPL-SCNC: 140 MMOL/L (ref 135–146)

## 2020-03-13 ENCOUNTER — OFFICE VISIT (OUTPATIENT)
Dept: INTERNAL MEDICINE | Facility: CLINIC | Age: 50
End: 2020-03-13
Payer: COMMERCIAL

## 2020-03-13 VITALS
HEIGHT: 63 IN | HEART RATE: 80 BPM | SYSTOLIC BLOOD PRESSURE: 124 MMHG | BODY MASS INDEX: 34.84 KG/M2 | OXYGEN SATURATION: 98 % | DIASTOLIC BLOOD PRESSURE: 76 MMHG | WEIGHT: 196.63 LBS

## 2020-03-13 DIAGNOSIS — E13.9 DIABETES MELLITUS DUE TO ABNORMAL INSULIN: Primary | ICD-10-CM

## 2020-03-13 DIAGNOSIS — I10 ESSENTIAL HYPERTENSION: ICD-10-CM

## 2020-03-13 DIAGNOSIS — R73.9 ELEVATED BLOOD SUGAR: ICD-10-CM

## 2020-03-13 DIAGNOSIS — E66.9 OBESITY (BMI 30.0-34.9): ICD-10-CM

## 2020-03-13 PROCEDURE — 3074F PR MOST RECENT SYSTOLIC BLOOD PRESSURE < 130 MM HG: ICD-10-PCS | Mod: CPTII,S$GLB,, | Performed by: INTERNAL MEDICINE

## 2020-03-13 PROCEDURE — 3074F SYST BP LT 130 MM HG: CPT | Mod: CPTII,S$GLB,, | Performed by: INTERNAL MEDICINE

## 2020-03-13 PROCEDURE — 99214 PR OFFICE/OUTPT VISIT, EST, LEVL IV, 30-39 MIN: ICD-10-PCS | Mod: S$GLB,,, | Performed by: INTERNAL MEDICINE

## 2020-03-13 PROCEDURE — 3078F PR MOST RECENT DIASTOLIC BLOOD PRESSURE < 80 MM HG: ICD-10-PCS | Mod: CPTII,S$GLB,, | Performed by: INTERNAL MEDICINE

## 2020-03-13 PROCEDURE — 99999 PR PBB SHADOW E&M-EST. PATIENT-LVL III: CPT | Mod: PBBFAC,,, | Performed by: INTERNAL MEDICINE

## 2020-03-13 PROCEDURE — 99214 OFFICE O/P EST MOD 30 MIN: CPT | Mod: S$GLB,,, | Performed by: INTERNAL MEDICINE

## 2020-03-13 PROCEDURE — 3008F BODY MASS INDEX DOCD: CPT | Mod: CPTII,S$GLB,, | Performed by: INTERNAL MEDICINE

## 2020-03-13 PROCEDURE — 3078F DIAST BP <80 MM HG: CPT | Mod: CPTII,S$GLB,, | Performed by: INTERNAL MEDICINE

## 2020-03-13 PROCEDURE — 3008F PR BODY MASS INDEX (BMI) DOCUMENTED: ICD-10-PCS | Mod: CPTII,S$GLB,, | Performed by: INTERNAL MEDICINE

## 2020-03-13 PROCEDURE — 3051F PR MOST RECENT HEMOGLOBIN A1C LEVEL 7.0 - < 8.0%: ICD-10-PCS | Mod: CPTII,S$GLB,, | Performed by: INTERNAL MEDICINE

## 2020-03-13 PROCEDURE — 3051F HG A1C>EQUAL 7.0%<8.0%: CPT | Mod: CPTII,S$GLB,, | Performed by: INTERNAL MEDICINE

## 2020-03-13 PROCEDURE — 99999 PR PBB SHADOW E&M-EST. PATIENT-LVL III: ICD-10-PCS | Mod: PBBFAC,,, | Performed by: INTERNAL MEDICINE

## 2020-03-13 RX ORDER — METFORMIN HYDROCHLORIDE 500 MG/1
500 TABLET ORAL 2 TIMES DAILY WITH MEALS
Qty: 60 TABLET | Refills: 3 | Status: SHIPPED | OUTPATIENT
Start: 2020-03-13 | End: 2020-12-07 | Stop reason: SDUPTHER

## 2020-03-13 RX ORDER — NYSTATIN 100000 U/G
CREAM TOPICAL
COMMUNITY
Start: 2018-07-03 | End: 2021-02-19

## 2020-03-13 NOTE — PROGRESS NOTES
Chief Complaint: Follow up of blood pressure, blood sugar, mood disorder     HPI:This is a 49 year old woman who presents for follow up of above.     She had influenza last week.    She has been taking amlodipine 10 mg daily in the evening.  No leg swelling, chest pain, shortness of breath.   She started on BP medication in 2019. She does not check her BP regularly at home.      Restless leg is not as bad. It is not keeping her awake.      She continues to be tired. She falls asleep without a problem. She can toss and turn if she wakes up at [facility]. .  She will take diazepam 5 mg at bedtime if she knows she cannot sleep (very infrequent).      She started on lexapro 3/2018. She was at 10 mg daily and was able to lower to 5 mg daily with control of her mood.      Periods are have been irregular - sporaic and light to heavy. - she had a uterine fibroid embolization several years ago due to heavy periods.      She had a colonoscopy at [facility] 3-5 years ago due to hemorrhoid issues.         REVIEW OF SYSTEMS: She denies fevers, chills, night sweats, visual change, hearing loss, sinus congestion, sore throat, chest pain, shortness of breath, nausea, vomiting, constipation, diarrhea, dysuria, hematuria, polydipsia, polyuria, joint pain, muscle pain, headaches,      Nearsighteded.                Past Medical History:   Diagnosis Date    Abnormal Pap smear of cervix 2019     colpo benign    Gestational hypertension      HTN (hypertension)             Past Surgical History:   Procedure Laterality Date    acl replacement Right       x 2    ANTERIOR CRUCIATE LIGAMENT REPAIR Right       SECTION         2    COLPOSCOPY   2019     Stephani Noriega MD    DILATION AND CURETTAGE OF UTERUS        UTERINE FIBROID EMBOLIZATION          Social History      Socioeconomic History    Marital status:        Spouse name: Not on file    Number of children: Not on file    Years of education: Not on  "file    Highest education level: Not on file   Occupational History    Not on file   Social Needs    Financial resource strain: Hard    Food insecurity:       Worry: Sometimes true       Inability: Sometimes true    Transportation needs:       Medical: No       Non-medical: No   Tobacco Use    Smoking status: Former Smoker       Packs/day: 1.00       Years: 19.00       Pack years: 19.00       Last attempt to quit: 2006       Years since quittin.1    Smokeless tobacco: Never Used   Substance and Sexual Activity    Alcohol use: Yes       Alcohol/week: 4.0 standard drinks       Types: 4 Glasses of wine per week       Comment: one drink 5 times a week    Drug use: No    Sexual activity: Yes       Partners: Male       Comment: , 2 kids   Lifestyle    Physical activity:       Days per week: 2 days       Minutes per session: 40 min    Stress: Very much   Relationships    Social connections:       Talks on phone: Three times a week       Gets together: Once a week       Attends Zoroastrian service: Not on file       Active member of club or organization: Yes       Attends meetings of clubs or organizations: More than 4 times per year       Relationship status:    Other Topics Concern    Not on file   Social History Narrative     Lives with  and children. Son with DM1, daughter with chromasomal disorder.     Working with  on increasing exercise     Generally very healthy diet, low-carb     Adopted                 Family Status   Relation Name Status    Mother Rosemary Aguilar Alive    Father Curtis Trotter Alive    Brother half sib Alive    Son Vikas Alive    Sister half sib Alive    MGM Eden Aguilar (Not Specified)    PGM Corine Trotter (Not Specified)    PGF Bubba Trotter (Not Specified)    Daughter Amanda Alive    Pat Uncle Alejandro Trotter (Not Specified)            Meds and allergies: updated on EPIC     /76   Pulse 80   Ht 5' 3" (1.6 " m)   Wt 89.2 kg (196 lb 10.4 oz)   SpO2 98%   BMI 34.84 kg/m²     General: alert, oriented x 3, no apparent distress.  Affect normal  HEENT: Conjunctivae: anicteric, PERRL, EOMI, TM clear, Oralpharynx clear  Neck: supple, no thyroid enlargement, no cervical lymphadenopathy  Resp: effort normal, lungs clear bilaterally  CV: Regular rate and rhythm without murmurs, gallops or rubs, no lower extremity edema,     Labs 3/11/20 - CMP and AIC reviewed - 7.8     Assessment/Plan:  1. Hypertension - does not want more medication. Discussed lifestyle modification. She will monitor her BP at home  2. Diabetes -  Metformin 500 mg twice daily  3. Mood disorder- stable on lexapro 5 mg daily  4. Insomnia and restless legs- try vitron C one tablet daily. Try Co Enzyme Q 10 200 mg daily. Discussed diet tonic water  5. Obesity - discussed diet and exercise.   I will see her back in 4 months, Sooner if problems arise

## 2020-05-29 ENCOUNTER — PATIENT OUTREACH (OUTPATIENT)
Dept: ADMINISTRATIVE | Facility: HOSPITAL | Age: 50
End: 2020-05-29

## 2020-06-10 LAB
ALBUMIN SERPL-MCNC: 4.4 G/DL (ref 3.6–5.1)
ALBUMIN/GLOB SERPL: 2 (CALC) (ref 1–2.5)
ALP SERPL-CCNC: 72 U/L (ref 37–153)
ALT SERPL-CCNC: 21 U/L (ref 6–29)
AST SERPL-CCNC: 16 U/L (ref 10–35)
BILIRUB SERPL-MCNC: 0.8 MG/DL (ref 0.2–1.2)
BUN SERPL-MCNC: 14 MG/DL (ref 7–25)
BUN/CREAT SERPL: ABNORMAL (CALC) (ref 6–22)
CALCIUM SERPL-MCNC: 9.2 MG/DL (ref 8.6–10.4)
CHLORIDE SERPL-SCNC: 103 MMOL/L (ref 98–110)
CO2 SERPL-SCNC: 28 MMOL/L (ref 20–32)
CREAT SERPL-MCNC: 0.72 MG/DL (ref 0.5–1.05)
GFRSERPLBLD MDRD-ARVRAT: 98 ML/MIN/1.73M2
GLOBULIN SER CALC-MCNC: 2.2 G/DL (CALC) (ref 1.9–3.7)
GLUCOSE SERPL-MCNC: 233 MG/DL (ref 65–99)
HBA1C MFR BLD: 7 % OF TOTAL HGB
POTASSIUM SERPL-SCNC: 4.2 MMOL/L (ref 3.5–5.3)
PROT SERPL-MCNC: 6.6 G/DL (ref 6.1–8.1)
SODIUM SERPL-SCNC: 141 MMOL/L (ref 135–146)

## 2020-06-12 ENCOUNTER — OFFICE VISIT (OUTPATIENT)
Dept: INTERNAL MEDICINE | Facility: CLINIC | Age: 50
End: 2020-06-12
Payer: COMMERCIAL

## 2020-06-12 VITALS
BODY MASS INDEX: 35.57 KG/M2 | WEIGHT: 200.75 LBS | SYSTOLIC BLOOD PRESSURE: 128 MMHG | DIASTOLIC BLOOD PRESSURE: 80 MMHG | OXYGEN SATURATION: 95 % | HEART RATE: 89 BPM | HEIGHT: 63 IN

## 2020-06-12 DIAGNOSIS — I10 ESSENTIAL HYPERTENSION: ICD-10-CM

## 2020-06-12 DIAGNOSIS — E13.9 DIABETES MELLITUS DUE TO ABNORMAL INSULIN: ICD-10-CM

## 2020-06-12 DIAGNOSIS — F41.9 ANXIETY: ICD-10-CM

## 2020-06-12 DIAGNOSIS — Z00.00 ROUTINE GENERAL MEDICAL EXAMINATION AT A HEALTH CARE FACILITY: Primary | ICD-10-CM

## 2020-06-12 PROBLEM — R73.9 ELEVATED BLOOD SUGAR: Status: RESOLVED | Noted: 2019-11-11 | Resolved: 2020-06-12

## 2020-06-12 PROCEDURE — 3008F BODY MASS INDEX DOCD: CPT | Mod: CPTII,S$GLB,, | Performed by: INTERNAL MEDICINE

## 2020-06-12 PROCEDURE — 99999 PR PBB SHADOW E&M-EST. PATIENT-LVL III: CPT | Mod: PBBFAC,,, | Performed by: INTERNAL MEDICINE

## 2020-06-12 PROCEDURE — 3074F PR MOST RECENT SYSTOLIC BLOOD PRESSURE < 130 MM HG: ICD-10-PCS | Mod: CPTII,S$GLB,, | Performed by: INTERNAL MEDICINE

## 2020-06-12 PROCEDURE — 3008F PR BODY MASS INDEX (BMI) DOCUMENTED: ICD-10-PCS | Mod: CPTII,S$GLB,, | Performed by: INTERNAL MEDICINE

## 2020-06-12 PROCEDURE — 3079F DIAST BP 80-89 MM HG: CPT | Mod: CPTII,S$GLB,, | Performed by: INTERNAL MEDICINE

## 2020-06-12 PROCEDURE — 3079F PR MOST RECENT DIASTOLIC BLOOD PRESSURE 80-89 MM HG: ICD-10-PCS | Mod: CPTII,S$GLB,, | Performed by: INTERNAL MEDICINE

## 2020-06-12 PROCEDURE — 99214 PR OFFICE/OUTPT VISIT, EST, LEVL IV, 30-39 MIN: ICD-10-PCS | Mod: S$GLB,,, | Performed by: INTERNAL MEDICINE

## 2020-06-12 PROCEDURE — 99999 PR PBB SHADOW E&M-EST. PATIENT-LVL III: ICD-10-PCS | Mod: PBBFAC,,, | Performed by: INTERNAL MEDICINE

## 2020-06-12 PROCEDURE — 3051F HG A1C>EQUAL 7.0%<8.0%: CPT | Mod: CPTII,S$GLB,, | Performed by: INTERNAL MEDICINE

## 2020-06-12 PROCEDURE — 3051F PR MOST RECENT HEMOGLOBIN A1C LEVEL 7.0 - < 8.0%: ICD-10-PCS | Mod: CPTII,S$GLB,, | Performed by: INTERNAL MEDICINE

## 2020-06-12 PROCEDURE — 3074F SYST BP LT 130 MM HG: CPT | Mod: CPTII,S$GLB,, | Performed by: INTERNAL MEDICINE

## 2020-06-12 PROCEDURE — 99214 OFFICE O/P EST MOD 30 MIN: CPT | Mod: S$GLB,,, | Performed by: INTERNAL MEDICINE

## 2020-06-12 RX ORDER — ESCITALOPRAM OXALATE 10 MG/1
10 TABLET ORAL DAILY
Qty: 30 TABLET | Refills: 11 | Status: SHIPPED | OUTPATIENT
Start: 2020-06-12 | End: 2021-03-11

## 2020-06-12 NOTE — PROGRESS NOTES
Chief Complaint: Follow up of blood pressure, blood sugar, mood disorder     HPI:This is a 49 year old woman who presents for follow up of above.        She has been taking amlodipine 10 mg daily in the evening.  No leg swelling, chest pain, shortness of breath.   She started on BP medication in 2019. She does not check her BP regularly at home.       Restless leg is not as bad. It is not keeping her awake.      She continues to be tired. She falls asleep without a problem. She can toss and turn if she wakes up at [facility]. .  She will take diazepam 5 mg at bedtime if she knows she cannot sleep (very infrequent).      She started on lexapro 3/2018. She was at 10 mg daily and was able to lower to 5 mg daily with control of her mood. Since the quarantine, she has been with her daughter all the time.  She is often short with her daughter and does not want to be irritable with her daughter.     She is taking metformin 500 mg once daily in the evening.  She had frequent stools taking the meformin twice daily so she is back to once daily.      Periods are have been irregular - sporaic and light to heavy. - she had a uterine fibroid embolization several years ago due to heavy periods.      She had a colonoscopy at [facility] 3-5 years ago due to hemorrhoid issues.         REVIEW OF SYSTEMS: She denies fevers, chills, night sweats, visual change, hearing loss, sinus congestion, sore throat, chest pain, shortness of breath, nausea, vomiting, constipation, diarrhea, dysuria, hematuria, polydipsia, polyuria, joint pain, muscle pain, headaches,      Nearsighteded.                    Past Medical History:   Diagnosis Date    Abnormal Pap smear of cervix 2019     colpo benign    Gestational hypertension      HTN (hypertension) 2019                Past Surgical History:   Procedure Laterality Date    acl replacement Right       x 2    ANTERIOR CRUCIATE LIGAMENT REPAIR Right       SECTION         2     COLPOSCOPY   2019     Stephani Noriega MD    DILATION AND CURETTAGE OF UTERUS        UTERINE FIBROID EMBOLIZATION          Social History            Socioeconomic History    Marital status:        Spouse name: Not on file    Number of children: Not on file    Years of education: Not on file    Highest education level: Not on file   Occupational History    Not on file   Social Needs    Financial resource strain: Hard    Food insecurity:       Worry: Sometimes true       Inability: Sometimes true    Transportation needs:       Medical: No       Non-medical: No   Tobacco Use    Smoking status: Former Smoker       Packs/day: 1.00       Years: 19.00       Pack years: 19.00       Last attempt to quit: 2006       Years since quittin.1    Smokeless tobacco: Never Used   Substance and Sexual Activity    Alcohol use: Yes       Alcohol/week: 4.0 standard drinks       Types: 4 Glasses of wine per week       Comment: one drink 5 times a week    Drug use: No    Sexual activity: Yes       Partners: Male       Comment: , 2 kids   Lifestyle    Physical activity:       Days per week: 2 days       Minutes per session: 40 min    Stress: Very much   Relationships    Social connections:       Talks on phone: Three times a week       Gets together: Once a week       Attends Hindu service: Not on file       Active member of club or organization: Yes       Attends meetings of clubs or organizations: More than 4 times per year       Relationship status:    Other Topics Concern    Not on file   Social History Narrative     Lives with  and children. Son with DM1, daughter with chromasomal disorder.     Working with  on increasing exercise     Generally very healthy diet, low-carb     Adopted                     Family Status   Relation Name Status    Mother Rosemary Aguilar Alive    Father Curtis Trotter Alive    Brother half sib Alive    Son Vikas  "Alive    Sister half sib Alive    MGM Eden Aguilar (Not Specified)    PGM Corine Trotter (Not Specified)    PGF Bubba Trotter (Not Specified)    Daughter Amanda Alive    Pat Uncle Alejandro Trotter (Not Specified)            Meds and allergies: updated on EPIC      /80   Pulse 89   Ht 5' 3" (1.6 m)   Wt 91 kg (200 lb 11.7 oz)   SpO2 95%   BMI 35.56 kg/m²     General: alert, oriented x 3, no apparent distress.  Affect normal  HEENT: Conjunctivae: anicteric, PERRL, EOMI, TM clear, Oralpharynx clear  Neck: supple, no thyroid enlargement, no cervical lymphadenopathy  Resp: effort normal, lungs clear bilaterally  CV: Regular rate and rhythm without murmurs, gallops or rubs, no lower extremity edema,      Labs - CMP and AIC reviewed - 7.0     Assessment/Plan:  1. Hypertension - controlled  2. Diabetes -  Better on Metformin 500 mg daily  3. Mood disorder- increase  lexapro to 10 mg daily  4. Insomnia and restless legs- try vitron C one tablet daily. Try Co Enzyme Q 10 200 mg daily. Discussed diet tonic water  5. Obesity - discussed diet and exercise.   I will see her back in 6 months, Sooner if problems arise     Answers for HPI/ROS submitted by the patient on 6/12/2020   Diabetes problem  Diabetes type: type 2  MedicAlert ID: No  Disease duration: 1 years  blurred vision: No  chest pain: No  fatigue: No  foot paresthesias: No  foot ulcerations: No  polydipsia: No  polyphagia: No  polyuria: No  visual change: No  weakness: No  weight loss: No  Symptom course: improving  confusion: No  dizziness: No  headaches: No  hunger: No  mood changes: No  nervous/anxious: No  pallor: No  seizures: No  sleepiness: No  speech difficulty: No  sweats: No  tremors: No  blackouts: No  hospitalization: No  nocturnal hypoglycemia: No  required assistance: No  required glucagon: No  CVA: No  heart disease: No  impotence: No  nephropathy: No  peripheral neuropathy: No  PVD: No  retinopathy: No  autonomic neuropathy: No  CAD risks: " hypertension, obesity, post-menopausal, stress, diabetes mellitus  Current treatments: oral agent (monotherapy)  Treatment compliance: most of the time  Home blood tests: 1-2 x per week  Monitoring compliance: inadequate  Blood glucose trend: fluctuating minimally  Weight trend: stable  Current diet: generally healthy  Meal planning: avoidance of concentrated sweets, carbohydrate counting  Exercise: intermittently  Dietitian visit: No  Eye exam current: No  Sees podiatrist: No

## 2020-07-23 ENCOUNTER — PATIENT OUTREACH (OUTPATIENT)
Dept: ADMINISTRATIVE | Facility: HOSPITAL | Age: 50
End: 2020-07-23

## 2020-08-27 DIAGNOSIS — Z12.11 COLON CANCER SCREENING: ICD-10-CM

## 2020-09-04 DIAGNOSIS — Z12.11 COLON CANCER SCREENING: ICD-10-CM

## 2020-09-18 DIAGNOSIS — Z12.11 COLON CANCER SCREENING: ICD-10-CM

## 2020-10-05 ENCOUNTER — PATIENT MESSAGE (OUTPATIENT)
Dept: ADMINISTRATIVE | Facility: HOSPITAL | Age: 50
End: 2020-10-05

## 2020-11-23 ENCOUNTER — PATIENT OUTREACH (OUTPATIENT)
Dept: ADMINISTRATIVE | Facility: HOSPITAL | Age: 50
End: 2020-11-23

## 2020-11-23 DIAGNOSIS — E78.5 HYPERLIPIDEMIA, UNSPECIFIED HYPERLIPIDEMIA TYPE: Primary | ICD-10-CM

## 2020-12-03 LAB
25(OH)D3 SERPL-MCNC: 18 NG/ML (ref 30–100)
ALBUMIN SERPL-MCNC: 4.2 G/DL (ref 3.6–5.1)
ALBUMIN/GLOB SERPL: 1.7 (CALC) (ref 1–2.5)
ALP SERPL-CCNC: 77 U/L (ref 37–153)
ALT SERPL-CCNC: 20 U/L (ref 6–29)
AST SERPL-CCNC: 16 U/L (ref 10–35)
BASOPHILS # BLD AUTO: 32 CELLS/UL (ref 0–200)
BASOPHILS NFR BLD AUTO: 0.4 %
BILIRUB SERPL-MCNC: 0.8 MG/DL (ref 0.2–1.2)
BUN SERPL-MCNC: 14 MG/DL (ref 7–25)
BUN/CREAT SERPL: NORMAL (CALC) (ref 6–22)
CALCIUM SERPL-MCNC: 9 MG/DL (ref 8.6–10.4)
CHLORIDE SERPL-SCNC: 99 MMOL/L (ref 98–110)
CHOLEST SERPL-MCNC: 163 MG/DL
CHOLEST/HDLC SERPL: 3 (CALC)
CO2 SERPL-SCNC: 31 MMOL/L (ref 20–32)
CREAT SERPL-MCNC: 0.62 MG/DL (ref 0.5–1.05)
EOSINOPHIL # BLD AUTO: 211 CELLS/UL (ref 15–500)
EOSINOPHIL NFR BLD AUTO: 2.6 %
ERYTHROCYTE [DISTWIDTH] IN BLOOD BY AUTOMATED COUNT: 13 % (ref 11–15)
GFRSERPLBLD MDRD-ARVRAT: 104 ML/MIN/1.73M2
GLOBULIN SER CALC-MCNC: 2.5 G/DL (CALC) (ref 1.9–3.7)
GLUCOSE SERPL-MCNC: 124 MG/DL (ref 65–139)
HBA1C MFR BLD: 7.2 % OF TOTAL HGB
HCT VFR BLD AUTO: 48.4 % (ref 35–45)
HDLC SERPL-MCNC: 55 MG/DL
HGB BLD-MCNC: 15.9 G/DL (ref 11.7–15.5)
LDLC SERPL CALC-MCNC: 82 MG/DL (CALC)
LYMPHOCYTES # BLD AUTO: 1863 CELLS/UL (ref 850–3900)
LYMPHOCYTES NFR BLD AUTO: 23 %
MCH RBC QN AUTO: 29 PG (ref 27–33)
MCHC RBC AUTO-ENTMCNC: 32.9 G/DL (ref 32–36)
MCV RBC AUTO: 88.3 FL (ref 80–100)
MONOCYTES # BLD AUTO: 640 CELLS/UL (ref 200–950)
MONOCYTES NFR BLD AUTO: 7.9 %
NEUTROPHILS # BLD AUTO: 5354 CELLS/UL (ref 1500–7800)
NEUTROPHILS NFR BLD AUTO: 66.1 %
NONHDLC SERPL-MCNC: 108 MG/DL (CALC)
PLATELET # BLD AUTO: 249 THOUSAND/UL (ref 140–400)
PMV BLD REES-ECKER: 11.9 FL (ref 7.5–12.5)
POTASSIUM SERPL-SCNC: 3.7 MMOL/L (ref 3.5–5.3)
PROT SERPL-MCNC: 6.7 G/DL (ref 6.1–8.1)
RBC # BLD AUTO: 5.48 MILLION/UL (ref 3.8–5.1)
SODIUM SERPL-SCNC: 139 MMOL/L (ref 135–146)
TRIGL SERPL-MCNC: 166 MG/DL
TSH SERPL-ACNC: 1.55 MIU/L
VIT B12 SERPL-MCNC: 402 PG/ML (ref 200–1100)
WBC # BLD AUTO: 8.1 THOUSAND/UL (ref 3.8–10.8)

## 2020-12-04 DIAGNOSIS — E11.9 TYPE 2 DIABETES MELLITUS WITHOUT COMPLICATION: ICD-10-CM

## 2020-12-07 ENCOUNTER — OFFICE VISIT (OUTPATIENT)
Dept: INTERNAL MEDICINE | Facility: CLINIC | Age: 50
End: 2020-12-07
Payer: COMMERCIAL

## 2020-12-07 VITALS
SYSTOLIC BLOOD PRESSURE: 110 MMHG | OXYGEN SATURATION: 99 % | BODY MASS INDEX: 35.26 KG/M2 | HEART RATE: 66 BPM | WEIGHT: 199.06 LBS | DIASTOLIC BLOOD PRESSURE: 80 MMHG

## 2020-12-07 DIAGNOSIS — D75.1 ERYTHROCYTOSIS: ICD-10-CM

## 2020-12-07 DIAGNOSIS — E13.9 DIABETES MELLITUS DUE TO ABNORMAL INSULIN: ICD-10-CM

## 2020-12-07 DIAGNOSIS — Z00.00 ROUTINE GENERAL MEDICAL EXAMINATION AT A HEALTH CARE FACILITY: Primary | ICD-10-CM

## 2020-12-07 PROCEDURE — 3074F SYST BP LT 130 MM HG: CPT | Mod: CPTII,S$GLB,, | Performed by: INTERNAL MEDICINE

## 2020-12-07 PROCEDURE — 3051F PR MOST RECENT HEMOGLOBIN A1C LEVEL 7.0 - < 8.0%: ICD-10-PCS | Mod: CPTII,S$GLB,, | Performed by: INTERNAL MEDICINE

## 2020-12-07 PROCEDURE — 3008F PR BODY MASS INDEX (BMI) DOCUMENTED: ICD-10-PCS | Mod: CPTII,S$GLB,, | Performed by: INTERNAL MEDICINE

## 2020-12-07 PROCEDURE — 3079F PR MOST RECENT DIASTOLIC BLOOD PRESSURE 80-89 MM HG: ICD-10-PCS | Mod: CPTII,S$GLB,, | Performed by: INTERNAL MEDICINE

## 2020-12-07 PROCEDURE — 99999 PR PBB SHADOW E&M-EST. PATIENT-LVL III: ICD-10-PCS | Mod: PBBFAC,,, | Performed by: INTERNAL MEDICINE

## 2020-12-07 PROCEDURE — 1126F AMNT PAIN NOTED NONE PRSNT: CPT | Mod: S$GLB,,, | Performed by: INTERNAL MEDICINE

## 2020-12-07 PROCEDURE — 99999 PR PBB SHADOW E&M-EST. PATIENT-LVL III: CPT | Mod: PBBFAC,,, | Performed by: INTERNAL MEDICINE

## 2020-12-07 PROCEDURE — 3074F PR MOST RECENT SYSTOLIC BLOOD PRESSURE < 130 MM HG: ICD-10-PCS | Mod: CPTII,S$GLB,, | Performed by: INTERNAL MEDICINE

## 2020-12-07 PROCEDURE — 99396 PREV VISIT EST AGE 40-64: CPT | Mod: S$GLB,,, | Performed by: INTERNAL MEDICINE

## 2020-12-07 PROCEDURE — 1126F PR PAIN SEVERITY QUANTIFIED, NO PAIN PRESENT: ICD-10-PCS | Mod: S$GLB,,, | Performed by: INTERNAL MEDICINE

## 2020-12-07 PROCEDURE — 3079F DIAST BP 80-89 MM HG: CPT | Mod: CPTII,S$GLB,, | Performed by: INTERNAL MEDICINE

## 2020-12-07 PROCEDURE — 3051F HG A1C>EQUAL 7.0%<8.0%: CPT | Mod: CPTII,S$GLB,, | Performed by: INTERNAL MEDICINE

## 2020-12-07 PROCEDURE — 3008F BODY MASS INDEX DOCD: CPT | Mod: CPTII,S$GLB,, | Performed by: INTERNAL MEDICINE

## 2020-12-07 PROCEDURE — 99396 PR PREVENTIVE VISIT,EST,40-64: ICD-10-PCS | Mod: S$GLB,,, | Performed by: INTERNAL MEDICINE

## 2020-12-07 RX ORDER — ACETAMINOPHEN 500 MG
1 TABLET ORAL DAILY
COMMUNITY
Start: 2020-12-07 | End: 2022-09-23

## 2020-12-07 RX ORDER — METFORMIN HYDROCHLORIDE 500 MG/1
500 TABLET ORAL 2 TIMES DAILY WITH MEALS
Qty: 60 TABLET | Refills: 6 | Status: SHIPPED | OUTPATIENT
Start: 2020-12-07 | End: 2021-03-29

## 2020-12-07 NOTE — PROGRESS NOTES
Chief Complaint:Annual exam and  Follow up of blood pressure, blood sugar, mood disorder     HPI:This is a 50 year old woman who presents for follow up of above.        She has been taking amlodipine 10 mg daily in the evening.  No leg swelling, chest pain, shortness of breath.   She started on BP medication in 2019. She does not check her BP regularly at home.       Restless leg is not as bad. It is not keeping her awake. She has not tried vitron C one tablet daily. Try Co Enzyme Q 10 200 mg daily. Discussed diet tonic water     She continues to be tired. She falls asleep without a problem. She can toss and turn if she wakes up at [facility]. .  She will take diazepam 5 mg at bedtime if she knows she cannot sleep (very infrequent).      She started on lexapro 3/2018. She is taking lexapro 10 mg daily.   Mood has been ok. She rarely has to take diazepam.     She is taking metformin 500 mg once daily in the evening.  She had frequent stools taking the meformin twice daily so she is back to once daily.      Periods are have been irregular - sporaic and light to heavy. - she had a uterine fibroid embolization several years ago due to heavy periods.      She had a colonoscopy at [facility] 3-5 years ago due to hemorrhoid issues.         REVIEW OF SYSTEMS: She denies fevers, chills, night sweats, visual change, hearing loss, sinus congestion, sore throat, chest pain, shortness of breath, nausea, vomiting, constipation, diarrhea, dysuria, hematuria, polydipsia, polyuria, joint pain, muscle pain, headaches,      Nearsighteded.                    Past Medical History:   Diagnosis Date    Abnormal Pap smear of cervix 2019     colpo benign    Gestational hypertension      HTN (hypertension)                 Past Surgical History:   Procedure Laterality Date    acl replacement Right       x 2    ANTERIOR CRUCIATE LIGAMENT REPAIR Right       SECTION         2    COLPOSCOPY   2019     Stephani Noriega MD     DILATION AND CURETTAGE OF UTERUS        UTERINE FIBROID EMBOLIZATION          Social History                Socioeconomic History    Marital status:        Spouse name: Not on file    Number of children: Not on file    Years of education: Not on file    Highest education level: Not on file   Occupational History    Not on file   Social Needs    Financial resource strain: Hard    Food insecurity:       Worry: Sometimes true       Inability: Sometimes true    Transportation needs:       Medical: No       Non-medical: No   Tobacco Use    Smoking status: Former Smoker       Packs/day: 1.00       Years: 19.00       Pack years: 19.00       Last attempt to quit: 2006       Years since quittin.1    Smokeless tobacco: Never Used   Substance and Sexual Activity    Alcohol use: Yes       Alcohol/week: 4.0 standard drinks       Types: 4 Glasses of wine per week       Comment: one drink 5 times a week    Drug use: No    Sexual activity: Yes       Partners: Male       Comment: , 2 kids   Lifestyle    Physical activity:       Days per week: 2 days       Minutes per session: 40 min    Stress: Very much   Relationships    Social connections:       Talks on phone: Three times a week       Gets together: Once a week       Attends Worship service: Not on file       Active member of club or organization: Yes       Attends meetings of clubs or organizations: More than 4 times per year       Relationship status:    Other Topics Concern    Not on file   Social History Narrative     Lives with  and children. Son with DM1, daughter with chromasomal disorder.     Working with  on increasing exercise     Generally very healthy diet, low-carb     Adopted                     Family Status   Relation Name Status    Mother Rosemary Aguilar Alive    Father Curtis Trotter Alive    Brother half sib Alive    Son Vikas Alive    Sister half sib Alive    [facility]  [place] (Not Specified)    PGM [place] Trotter (Not Specified)    PGF Bubba Trotter (Not Specified)    Daughter Amanda Alive    Pat Uncle Alejandro Trotter (Not Specified)            Meds and allergies: updated on EPIC         General: alert, oriented x 3, no apparent distress.  Affect normal  HEENT: Conjunctivae: anicteric, PERRL, EOMI, TM clear, Oralpharynx clear  Neck: supple, no thyroid enlargement, no cervical lymphadenopathy  Resp: effort normal, lungs clear bilaterally  CV: Regular rate and rhythm without murmurs, gallops or rubs, no lower extremity edema,     Protective Sensation (w/ 10 gram monofilament):  Right: Intact  Left: Intact    Visual Inspection:  Normal -  Bilateral    Pedal Pulses:   Right: Present  Left: Present    Posterior tibialis:   Right:Present  Left: Present         Labs -12/2/20  Reviewed. A1C 7.2      Assessment/Plan:    Annual exam - discussed diet, exercise and safety issues.    1. Hypertension - controlled  2. Diabetes -  Better on Metformin 500 mg daily  3. Mood disorder-   lexapro to 10 mg daily  4. Insomnia and restless legs- try vitron C one tablet daily. Try Co Enzyme Q 10 200 mg daily. Discussed diet tonic water  5. Obesity - discussed diet and exercise.   6. Vitamin D deficiency - vitamin D 2000 units daily   I will see her back in 6 months, Sooner if problems arise    Answers for HPI/ROS submitted by the patient on 12/6/2020   Diabetes problem  Diabetes type: type 2  MedicAlert ID: No  Disease duration: 1 years  blurred vision: No  chest pain: No  fatigue: No  foot paresthesias: No  foot ulcerations: No  polydipsia: No  polyphagia: No  polyuria: No  visual change: No  weakness: No  weight loss: No  Symptom course: stable  confusion: No  dizziness: No  headaches: No  hunger: No  mood changes: No  nervous/anxious: No  pallor: No  seizures: No  sleepiness: No  speech difficulty: No  sweats: No  tremors: No  blackouts: No  hospitalization: No  nocturnal hypoglycemia:  No  required assistance: No  required glucagon: No  CVA: No  heart disease: No  impotence: No  nephropathy: No  peripheral neuropathy: No  PVD: No  autonomic neuropathy: No  CAD risks: family history, hypertension, obesity  Current treatments: oral agent (monotherapy)  Treatment compliance: most of the time  Home blood tests: 1-2 x per week  Home urines: <1 x per month  Monitoring compliance: inadequate  Blood glucose trend: fluctuating minimally  Weight trend: stable  Current diet: generally healthy  Meal planning: carbohydrate counting  Exercise: intermittently  Dietitian visit: No  Eye exam current: No  Sees podiatrist: No

## 2020-12-09 ENCOUNTER — CLINICAL SUPPORT (OUTPATIENT)
Dept: OPTOMETRY | Facility: CLINIC | Age: 50
End: 2020-12-09
Attending: INTERNAL MEDICINE
Payer: COMMERCIAL

## 2020-12-09 DIAGNOSIS — E11.9 TYPE 2 DIABETES MELLITUS WITHOUT COMPLICATION, UNSPECIFIED WHETHER LONG TERM INSULIN USE: ICD-10-CM

## 2020-12-09 DIAGNOSIS — E11.9 TYPE 2 DIABETES MELLITUS WITHOUT COMPLICATION: ICD-10-CM

## 2020-12-09 PROCEDURE — 92250 DIABETIC EYE SCREENING PHOTO: ICD-10-PCS | Mod: 26,S$GLB,, | Performed by: OPHTHALMOLOGY

## 2020-12-09 PROCEDURE — 92250 DIABETIC EYE SCREENING PHOTO: ICD-10-PCS | Mod: TC,S$GLB,, | Performed by: INTERNAL MEDICINE

## 2020-12-09 PROCEDURE — 92250 FUNDUS PHOTOGRAPHY W/I&R: CPT | Mod: 26,S$GLB,, | Performed by: OPHTHALMOLOGY

## 2020-12-09 PROCEDURE — 92250 FUNDUS PHOTOGRAPHY W/I&R: CPT | Mod: TC,S$GLB,, | Performed by: INTERNAL MEDICINE

## 2020-12-09 NOTE — PROGRESS NOTES
HPI     Diabetic Eye Exam      Additional comments: photos              Comments     Screening photos          Last edited by Yeimi Turpin MA on 12/9/2020 10:37 AM. (History)            Assessment /Plan     For exam results, see Encounter Report.    Type 2 diabetes mellitus without complication, unspecified whether long term insulin use  -     Diabetic Eye Screening Photo      50 y.o. y/o here for screening for Diabetic Renopathy with non-dilated fundus photos per Zaria Conner MD

## 2020-12-12 LAB
BASOPHILS # BLD AUTO: 33 CELLS/UL (ref 0–200)
BASOPHILS NFR BLD AUTO: 0.4 %
EOSINOPHIL # BLD AUTO: 191 CELLS/UL (ref 15–500)
EOSINOPHIL NFR BLD AUTO: 2.3 %
ERYTHROCYTE [DISTWIDTH] IN BLOOD BY AUTOMATED COUNT: 13.1 % (ref 11–15)
HCT VFR BLD AUTO: 47.8 % (ref 35–45)
HGB BLD-MCNC: 15.7 G/DL (ref 11.7–15.5)
LYMPHOCYTES # BLD AUTO: 2366 CELLS/UL (ref 850–3900)
LYMPHOCYTES NFR BLD AUTO: 28.5 %
MCH RBC QN AUTO: 29.2 PG (ref 27–33)
MCHC RBC AUTO-ENTMCNC: 32.8 G/DL (ref 32–36)
MCV RBC AUTO: 88.8 FL (ref 80–100)
MONOCYTES # BLD AUTO: 631 CELLS/UL (ref 200–950)
MONOCYTES NFR BLD AUTO: 7.6 %
NEUTROPHILS # BLD AUTO: 5080 CELLS/UL (ref 1500–7800)
NEUTROPHILS NFR BLD AUTO: 61.2 %
PLATELET # BLD AUTO: 248 THOUSAND/UL (ref 140–400)
PMV BLD REES-ECKER: 11.2 FL (ref 7.5–12.5)
RBC # BLD AUTO: 5.38 MILLION/UL (ref 3.8–5.1)
WBC # BLD AUTO: 8.3 THOUSAND/UL (ref 3.8–10.8)

## 2020-12-16 ENCOUNTER — PATIENT MESSAGE (OUTPATIENT)
Dept: INTERNAL MEDICINE | Facility: CLINIC | Age: 50
End: 2020-12-16

## 2020-12-21 DIAGNOSIS — I10 ESSENTIAL HYPERTENSION: ICD-10-CM

## 2020-12-21 NOTE — TELEPHONE ENCOUNTER
No new care gaps identified.  Powered by [employer]. Reference number: [id number]. 12/21/2020 8:50:09 AM   CST

## 2020-12-22 RX ORDER — AMLODIPINE BESYLATE 10 MG/1
TABLET ORAL
Qty: 90 TABLET | Refills: 3 | Status: SHIPPED | OUTPATIENT
Start: 2020-12-22 | End: 2022-03-12 | Stop reason: SDUPTHER

## 2020-12-23 NOTE — PROGRESS NOTES
Refill Authorization Note   Gege Valenzuela  is requesting a refill authorization.  Brief Assessment and Rationale for Refill:  Approve     Medication Therapy Plan:  CDML    Medication Reconciliation Completed: No   Comments:   Orders Placed This Encounter    amLODIPine (NORVASC) 10 MG tablet      Requested Prescriptions   Signed Prescriptions Disp Refills    amLODIPine (NORVASC) 10 MG tablet 90 tablet 3     Sig: TAKE 1 TABLET(10 MG) BY MOUTH EVERY DAY       Cardiovascular:  Calcium Channel Blockers Passed - 12/21/2020  8:49 AM        Passed - Patient is at least 18 years old        Passed - Last BP in normal range within 360 days.     BP Readings from Last 3 Encounters:   12/07/20 110/80   06/12/20 128/80   03/13/20 124/76              Passed - Office visit in past 12 months or future 90 days     Recent Outpatient Visits            2 weeks ago Routine general medical examination at a health care facility    [facility] Zaria Conner MD    6 months ago Routine general medical examination at a health care facility    [facility] berenice Conner MD    9 months ago Diabetes mellitus due to abnormal insulin    [facility] Zaria Conner MD    10 months ago Influenza A    [facility] Sal[facility] NP-XAVIER    1 year ago Essential hypertension    [facility]          Future App[facility]ments              In 3 months Zaria Conner MD [facility]                    Appointments  past 12m or future 3m with PCP    Date Provider   Last Visit   12/7/2020 Zaria Conner MD   Next Visit   3/29/2021 Zaria Conner MD   ED visits in past 90 days: 0     Note composed:6:21 PM 12/22/2020

## 2020-12-30 ENCOUNTER — PATIENT MESSAGE (OUTPATIENT)
Dept: INTERNAL MEDICINE | Facility: CLINIC | Age: 50
End: 2020-12-30

## 2020-12-30 DIAGNOSIS — D75.1 POLYCYTHEMIA: ICD-10-CM

## 2020-12-30 DIAGNOSIS — E11.9 TYPE 2 DIABETES MELLITUS WITHOUT COMPLICATION: ICD-10-CM

## 2020-12-30 DIAGNOSIS — D58.2 ELEVATED HEMOGLOBIN: Primary | ICD-10-CM

## 2021-01-04 ENCOUNTER — PATIENT MESSAGE (OUTPATIENT)
Dept: ADMINISTRATIVE | Facility: HOSPITAL | Age: 51
End: 2021-01-04

## 2021-01-06 DIAGNOSIS — Z12.31 OTHER SCREENING MAMMOGRAM: ICD-10-CM

## 2021-01-06 DIAGNOSIS — E11.9 TYPE 2 DIABETES MELLITUS WITHOUT COMPLICATION: ICD-10-CM

## 2021-02-11 ENCOUNTER — OFFICE VISIT (OUTPATIENT)
Dept: HEMATOLOGY/ONCOLOGY | Facility: CLINIC | Age: 51
End: 2021-02-11
Payer: COMMERCIAL

## 2021-02-11 VITALS
DIASTOLIC BLOOD PRESSURE: 80 MMHG | BODY MASS INDEX: 37.1 KG/M2 | HEART RATE: 77 BPM | TEMPERATURE: 99 F | HEIGHT: 62 IN | WEIGHT: 201.63 LBS | SYSTOLIC BLOOD PRESSURE: 139 MMHG | RESPIRATION RATE: 12 BRPM | OXYGEN SATURATION: 95 %

## 2021-02-11 DIAGNOSIS — E66.9 OBESITY, CLASS II, BMI 35-39.9: ICD-10-CM

## 2021-02-11 DIAGNOSIS — D58.2 ELEVATED HEMOGLOBIN: ICD-10-CM

## 2021-02-11 DIAGNOSIS — D75.1 POLYCYTHEMIA: Primary | ICD-10-CM

## 2021-02-11 PROCEDURE — 1126F PR PAIN SEVERITY QUANTIFIED, NO PAIN PRESENT: ICD-10-PCS | Mod: S$GLB,,, | Performed by: INTERNAL MEDICINE

## 2021-02-11 PROCEDURE — 3079F PR MOST RECENT DIASTOLIC BLOOD PRESSURE 80-89 MM HG: ICD-10-PCS | Mod: CPTII,S$GLB,, | Performed by: INTERNAL MEDICINE

## 2021-02-11 PROCEDURE — 3008F BODY MASS INDEX DOCD: CPT | Mod: CPTII,S$GLB,, | Performed by: INTERNAL MEDICINE

## 2021-02-11 PROCEDURE — 3008F PR BODY MASS INDEX (BMI) DOCUMENTED: ICD-10-PCS | Mod: CPTII,S$GLB,, | Performed by: INTERNAL MEDICINE

## 2021-02-11 PROCEDURE — 99999 PR PBB SHADOW E&M-EST. PATIENT-LVL IV: ICD-10-PCS | Mod: PBBFAC,,, | Performed by: INTERNAL MEDICINE

## 2021-02-11 PROCEDURE — 1126F AMNT PAIN NOTED NONE PRSNT: CPT | Mod: S$GLB,,, | Performed by: INTERNAL MEDICINE

## 2021-02-11 PROCEDURE — 99204 OFFICE O/P NEW MOD 45 MIN: CPT | Mod: S$GLB,,, | Performed by: INTERNAL MEDICINE

## 2021-02-11 PROCEDURE — 3079F DIAST BP 80-89 MM HG: CPT | Mod: CPTII,S$GLB,, | Performed by: INTERNAL MEDICINE

## 2021-02-11 PROCEDURE — 3075F PR MOST RECENT SYSTOLIC BLOOD PRESS GE 130-139MM HG: ICD-10-PCS | Mod: CPTII,S$GLB,, | Performed by: INTERNAL MEDICINE

## 2021-02-11 PROCEDURE — 3075F SYST BP GE 130 - 139MM HG: CPT | Mod: CPTII,S$GLB,, | Performed by: INTERNAL MEDICINE

## 2021-02-11 PROCEDURE — 99204 PR OFFICE/OUTPT VISIT, NEW, LEVL IV, 45-59 MIN: ICD-10-PCS | Mod: S$GLB,,, | Performed by: INTERNAL MEDICINE

## 2021-02-11 PROCEDURE — 99999 PR PBB SHADOW E&M-EST. PATIENT-LVL IV: CPT | Mod: PBBFAC,,, | Performed by: INTERNAL MEDICINE

## 2021-02-12 ENCOUNTER — PATIENT MESSAGE (OUTPATIENT)
Dept: HEMATOLOGY/ONCOLOGY | Facility: CLINIC | Age: 51
End: 2021-02-12

## 2021-02-12 DIAGNOSIS — D75.1 POLYCYTHEMIA: ICD-10-CM

## 2021-02-12 DIAGNOSIS — D58.2 ELEVATED HEMOGLOBIN: Primary | ICD-10-CM

## 2021-02-19 PROBLEM — E66.812 OBESITY, CLASS II, BMI 35-39.9: Status: ACTIVE | Noted: 2019-07-03

## 2021-02-26 ENCOUNTER — PATIENT MESSAGE (OUTPATIENT)
Dept: HEMATOLOGY/ONCOLOGY | Facility: CLINIC | Age: 51
End: 2021-02-26

## 2021-03-06 LAB
BLOCK/SPECIMEN ID: NORMAL
CALR EXON 9 MUT ANL BLD/T: NOT DETECTED
CLINICAL INFO: NORMAL
CSF3R EXON 14/17 MUTATION: NOT DETECTED
EPO SERPL-ACNC: 6.7 MIU/ML (ref 2.6–18.5)
JAK2 EXON 12 MUT ANL BLD/T: NOT DETECTED
JAK2 P.V617F BLD/T QL: NOT DETECTED
MPL P.W515L+W515K+S505N BLD/T QL: NOT DETECTED
NARRATIVE DIAGNOSTIC REPORT-IMP: NORMAL
SERVICE CMNT 05-IMP: NORMAL
SPECIMEN SOURCE: NORMAL

## 2021-03-08 ENCOUNTER — TELEPHONE (OUTPATIENT)
Dept: HEMATOLOGY/ONCOLOGY | Facility: CLINIC | Age: 51
End: 2021-03-08

## 2021-03-08 DIAGNOSIS — D58.2 ELEVATED HEMOGLOBIN: Primary | ICD-10-CM

## 2021-03-08 DIAGNOSIS — D75.1 POLYCYTHEMIA: ICD-10-CM

## 2021-03-08 DIAGNOSIS — E66.9 OBESITY, CLASS II, BMI 35-39.9: Primary | ICD-10-CM

## 2021-03-09 ENCOUNTER — TELEPHONE (OUTPATIENT)
Dept: HEMATOLOGY/ONCOLOGY | Facility: CLINIC | Age: 51
End: 2021-03-09

## 2021-03-09 ENCOUNTER — PATIENT OUTREACH (OUTPATIENT)
Dept: ADMINISTRATIVE | Facility: OTHER | Age: 51
End: 2021-03-09

## 2021-03-09 DIAGNOSIS — Z12.11 ENCOUNTER FOR COLORECTAL CANCER SCREENING: Primary | ICD-10-CM

## 2021-03-09 DIAGNOSIS — Z12.12 ENCOUNTER FOR COLORECTAL CANCER SCREENING: Primary | ICD-10-CM

## 2021-03-11 ENCOUNTER — OFFICE VISIT (OUTPATIENT)
Dept: SLEEP MEDICINE | Facility: CLINIC | Age: 51
End: 2021-03-11
Payer: COMMERCIAL

## 2021-03-11 DIAGNOSIS — D75.1 SECONDARY POLYCYTHEMIA: Primary | ICD-10-CM

## 2021-03-11 DIAGNOSIS — R06.83 SNORING: ICD-10-CM

## 2021-03-11 DIAGNOSIS — I10 ESSENTIAL HYPERTENSION: ICD-10-CM

## 2021-03-11 DIAGNOSIS — E13.9 DIABETES MELLITUS DUE TO ABNORMAL INSULIN: ICD-10-CM

## 2021-03-11 DIAGNOSIS — E66.9 OBESITY, CLASS II, BMI 35-39.9: ICD-10-CM

## 2021-03-11 DIAGNOSIS — D58.2 ELEVATED HEMOGLOBIN: ICD-10-CM

## 2021-03-11 PROCEDURE — 99203 PR OFFICE/OUTPT VISIT, NEW, LEVL III, 30-44 MIN: ICD-10-PCS | Mod: 95,,, | Performed by: INTERNAL MEDICINE

## 2021-03-11 PROCEDURE — 99203 OFFICE O/P NEW LOW 30 MIN: CPT | Mod: 95,,, | Performed by: INTERNAL MEDICINE

## 2021-03-11 PROCEDURE — 3051F HG A1C>EQUAL 7.0%<8.0%: CPT | Mod: CPTII,,, | Performed by: INTERNAL MEDICINE

## 2021-03-11 PROCEDURE — 3051F PR MOST RECENT HEMOGLOBIN A1C LEVEL 7.0 - < 8.0%: ICD-10-PCS | Mod: CPTII,,, | Performed by: INTERNAL MEDICINE

## 2021-03-12 ENCOUNTER — PATIENT MESSAGE (OUTPATIENT)
Dept: INTERNAL MEDICINE | Facility: CLINIC | Age: 51
End: 2021-03-12

## 2021-03-12 DIAGNOSIS — E13.9 DIABETES MELLITUS DUE TO ABNORMAL INSULIN: Primary | ICD-10-CM

## 2021-03-15 ENCOUNTER — TELEPHONE (OUTPATIENT)
Dept: SLEEP MEDICINE | Facility: OTHER | Age: 51
End: 2021-03-15

## 2021-03-16 LAB
ALBUMIN: 4.6 GRAM/DL (ref 3.5–5)
ALP SERPL-CCNC: 88 UNIT/L (ref 38–126)
ALT SERPL W P-5'-P-CCNC: 26 UNIT/L (ref 7–56)
ANION GAP SERPL CALC-SCNC: 16 MEQ/L (ref 9–18)
AST SERPL-CCNC: 19 UNIT/L (ref 7–40)
BASOPHILS ABSOLUTE COUNT: 0 K/UL (ref 0–0.2)
BASOPHILS NFR BLD: 0.3 % (ref 0–2)
BILIRUB SERPL-MCNC: 0.9 MG/DL (ref 0–1.2)
BUN BLD-MCNC: 11 MG/DL (ref 7–21)
BUN/CREAT SERPL: 14 RATIO (ref 6–22)
CALC OSMOLALITY: 281 MOSM/KG (ref 275–295)
CALCIUM SERPL-MCNC: 9.5 MG/DL (ref 8.5–10.4)
CHLORIDE SERPL-SCNC: 98 MEQ/L (ref 98–107)
CO2 SERPL-SCNC: 28 MEQ/L (ref 21–31)
CREAT SERPL-MCNC: 0.8 MG/DL (ref 0.5–1)
DIFFERENTIAL TYPE: ABNORMAL
EOSINOPHIL NFR BLD: 3.7 % (ref 0–4)
EOSINOPHILS ABSOLUTE COUNT: 0.4 K/UL (ref 0–0.7)
ERYTHROCYTE [DISTWIDTH] IN BLOOD BY AUTOMATED COUNT: 12.9 GRAM/DL (ref 12–15.3)
GFR: 81.5 ML/MIN/1.73M2
GLUCOSE SERPL-MCNC: 199 MG/DL (ref 70–100)
HBA1C MFR BLD: 7.4 % (ref 4.5–5.7)
HCT VFR BLD AUTO: 46.4 % (ref 37–47)
HGB BLD-MCNC: 15.9 GRAM/DL (ref 12–16)
LYMPHOCYTES %: 23.2 % (ref 15–45)
LYMPHOCYTES ABSOLUTE COUNT: 2.2 K/UL (ref 1–4.2)
MCH RBC QN AUTO: 29.8 PICOGRAM (ref 27–33)
MCHC RBC AUTO-ENTMCNC: 34.2 GRAM/DL (ref 32–36)
MCV RBC AUTO: 87 FEMTOLITER (ref 81–99)
MONOCYTES %: 8.1 % (ref 3–13)
MONOCYTES ABSOLUTE COUNT: 0.8 K/UL (ref 0.1–0.8)
NEUTROPHILS ABSOLUTE COUNT: 6.2 K/UL (ref 2.1–7.6)
NEUTROPHILS RELATIVE PERCENT: 64.7 % (ref 32–80)
PLATELET # BLD AUTO: 243 K/UL (ref 150–350)
PMV BLD AUTO: 10.3 FEMTOLITER (ref 7–10.2)
POTASSIUM SERPL-SCNC: 4 MEQ/L (ref 3.5–5)
RBC # BLD AUTO: 5.33 MIL/UL (ref 4.2–5.4)
SODIUM BLD-SCNC: 138 MEQ/L (ref 135–145)
TOTAL PROTEIN: 6.9 GRAM/DL (ref 6.3–8.2)
WBC # BLD AUTO: 9.6 K/UL (ref 4.5–11)

## 2021-03-19 ENCOUNTER — PATIENT MESSAGE (OUTPATIENT)
Dept: HEMATOLOGY/ONCOLOGY | Facility: CLINIC | Age: 51
End: 2021-03-19

## 2021-03-29 ENCOUNTER — OFFICE VISIT (OUTPATIENT)
Dept: INTERNAL MEDICINE | Facility: CLINIC | Age: 51
End: 2021-03-29
Payer: COMMERCIAL

## 2021-03-29 VITALS
OXYGEN SATURATION: 99 % | WEIGHT: 201.19 LBS | BODY MASS INDEX: 37.02 KG/M2 | HEART RATE: 70 BPM | DIASTOLIC BLOOD PRESSURE: 84 MMHG | SYSTOLIC BLOOD PRESSURE: 126 MMHG | HEIGHT: 62 IN

## 2021-03-29 DIAGNOSIS — E11.9 TYPE 2 DIABETES MELLITUS WITHOUT COMPLICATION, WITHOUT LONG-TERM CURRENT USE OF INSULIN: ICD-10-CM

## 2021-03-29 DIAGNOSIS — I10 ESSENTIAL HYPERTENSION: Primary | ICD-10-CM

## 2021-03-29 DIAGNOSIS — E66.9 OBESITY, CLASS II, BMI 35-39.9: ICD-10-CM

## 2021-03-29 PROCEDURE — 99999 PR PBB SHADOW E&M-EST. PATIENT-LVL III: ICD-10-PCS | Mod: PBBFAC,,, | Performed by: INTERNAL MEDICINE

## 2021-03-29 PROCEDURE — 3008F PR BODY MASS INDEX (BMI) DOCUMENTED: ICD-10-PCS | Mod: CPTII,S$GLB,, | Performed by: INTERNAL MEDICINE

## 2021-03-29 PROCEDURE — 99214 OFFICE O/P EST MOD 30 MIN: CPT | Mod: S$GLB,,, | Performed by: INTERNAL MEDICINE

## 2021-03-29 PROCEDURE — 1126F AMNT PAIN NOTED NONE PRSNT: CPT | Mod: S$GLB,,, | Performed by: INTERNAL MEDICINE

## 2021-03-29 PROCEDURE — 99214 PR OFFICE/OUTPT VISIT, EST, LEVL IV, 30-39 MIN: ICD-10-PCS | Mod: S$GLB,,, | Performed by: INTERNAL MEDICINE

## 2021-03-29 PROCEDURE — 3051F HG A1C>EQUAL 7.0%<8.0%: CPT | Mod: CPTII,S$GLB,, | Performed by: INTERNAL MEDICINE

## 2021-03-29 PROCEDURE — 3074F PR MOST RECENT SYSTOLIC BLOOD PRESSURE < 130 MM HG: ICD-10-PCS | Mod: CPTII,S$GLB,, | Performed by: INTERNAL MEDICINE

## 2021-03-29 PROCEDURE — 3079F PR MOST RECENT DIASTOLIC BLOOD PRESSURE 80-89 MM HG: ICD-10-PCS | Mod: CPTII,S$GLB,, | Performed by: INTERNAL MEDICINE

## 2021-03-29 PROCEDURE — 3074F SYST BP LT 130 MM HG: CPT | Mod: CPTII,S$GLB,, | Performed by: INTERNAL MEDICINE

## 2021-03-29 PROCEDURE — 1126F PR PAIN SEVERITY QUANTIFIED, NO PAIN PRESENT: ICD-10-PCS | Mod: S$GLB,,, | Performed by: INTERNAL MEDICINE

## 2021-03-29 PROCEDURE — 3051F PR MOST RECENT HEMOGLOBIN A1C LEVEL 7.0 - < 8.0%: ICD-10-PCS | Mod: CPTII,S$GLB,, | Performed by: INTERNAL MEDICINE

## 2021-03-29 PROCEDURE — 99999 PR PBB SHADOW E&M-EST. PATIENT-LVL III: CPT | Mod: PBBFAC,,, | Performed by: INTERNAL MEDICINE

## 2021-03-29 PROCEDURE — 3008F BODY MASS INDEX DOCD: CPT | Mod: CPTII,S$GLB,, | Performed by: INTERNAL MEDICINE

## 2021-03-29 PROCEDURE — 3079F DIAST BP 80-89 MM HG: CPT | Mod: CPTII,S$GLB,, | Performed by: INTERNAL MEDICINE

## 2021-03-29 RX ORDER — METFORMIN HYDROCHLORIDE 500 MG/1
1000 TABLET, EXTENDED RELEASE ORAL DAILY
Qty: 60 TABLET | Refills: 11 | Status: SHIPPED | OUTPATIENT
Start: 2021-03-29 | End: 2022-04-29 | Stop reason: SDUPTHER

## 2021-03-31 ENCOUNTER — PATIENT MESSAGE (OUTPATIENT)
Dept: INTERNAL MEDICINE | Facility: CLINIC | Age: 51
End: 2021-03-31

## 2021-04-05 ENCOUNTER — PATIENT MESSAGE (OUTPATIENT)
Dept: ADMINISTRATIVE | Facility: HOSPITAL | Age: 51
End: 2021-04-05

## 2021-04-06 ENCOUNTER — TELEPHONE (OUTPATIENT)
Dept: SLEEP MEDICINE | Facility: OTHER | Age: 51
End: 2021-04-06

## 2021-04-07 ENCOUNTER — HOSPITAL ENCOUNTER (OUTPATIENT)
Dept: SLEEP MEDICINE | Facility: OTHER | Age: 51
Discharge: HOME | End: 2021-04-07
Attending: INTERNAL MEDICINE
Payer: COMMERCIAL

## 2021-04-07 ENCOUNTER — TELEPHONE (OUTPATIENT)
Dept: SLEEP MEDICINE | Facility: OTHER | Age: 51
End: 2021-04-07

## 2021-04-07 DIAGNOSIS — D75.1 SECONDARY POLYCYTHEMIA: ICD-10-CM

## 2021-04-07 DIAGNOSIS — I10 ESSENTIAL HYPERTENSION: ICD-10-CM

## 2021-04-07 DIAGNOSIS — D58.2 ELEVATED HEMOGLOBIN: ICD-10-CM

## 2021-04-07 DIAGNOSIS — G47.33 OSA (OBSTRUCTIVE SLEEP APNEA): Primary | ICD-10-CM

## 2021-04-07 DIAGNOSIS — E66.9 OBESITY, CLASS II, BMI 35-39.9: ICD-10-CM

## 2021-04-07 DIAGNOSIS — E13.9 DIABETES MELLITUS DUE TO ABNORMAL INSULIN: ICD-10-CM

## 2021-04-07 DIAGNOSIS — R06.83 SNORING: ICD-10-CM

## 2021-04-07 PROCEDURE — 95800 SLP STDY UNATTENDED: CPT

## 2021-04-07 PROCEDURE — 95800 PR SLEEP STUDY, UNATTENDED, RECORD HEART RATE/O2 SAT/RESP ANAL/SLEEP TIME: ICD-10-PCS | Mod: 26,,, | Performed by: INTERNAL MEDICINE

## 2021-04-07 PROCEDURE — 95800 SLP STDY UNATTENDED: CPT | Mod: 26,,, | Performed by: INTERNAL MEDICINE

## 2021-04-12 ENCOUNTER — PATIENT MESSAGE (OUTPATIENT)
Dept: INTERNAL MEDICINE | Facility: CLINIC | Age: 51
End: 2021-04-12

## 2021-04-12 DIAGNOSIS — E13.9 DIABETES MELLITUS DUE TO ABNORMAL INSULIN: Primary | ICD-10-CM

## 2021-04-13 ENCOUNTER — PATIENT MESSAGE (OUTPATIENT)
Dept: INTERNAL MEDICINE | Facility: CLINIC | Age: 51
End: 2021-04-13

## 2021-04-15 ENCOUNTER — PATIENT MESSAGE (OUTPATIENT)
Dept: SLEEP MEDICINE | Facility: CLINIC | Age: 51
End: 2021-04-15

## 2021-04-15 ENCOUNTER — PATIENT MESSAGE (OUTPATIENT)
Dept: RESEARCH | Facility: HOSPITAL | Age: 51
End: 2021-04-15

## 2021-04-15 DIAGNOSIS — G47.33 OSA (OBSTRUCTIVE SLEEP APNEA): Primary | ICD-10-CM

## 2021-04-16 ENCOUNTER — PATIENT MESSAGE (OUTPATIENT)
Dept: INTERNAL MEDICINE | Facility: CLINIC | Age: 51
End: 2021-04-16

## 2021-05-10 ENCOUNTER — TELEPHONE (OUTPATIENT)
Dept: OPTOMETRY | Facility: CLINIC | Age: 51
End: 2021-05-10

## 2021-06-29 ENCOUNTER — TELEPHONE (OUTPATIENT)
Dept: INTERNAL MEDICINE | Facility: CLINIC | Age: 51
End: 2021-06-29

## 2021-07-01 ENCOUNTER — PATIENT OUTREACH (OUTPATIENT)
Dept: ADMINISTRATIVE | Facility: OTHER | Age: 51
End: 2021-07-01

## 2021-07-01 ENCOUNTER — TELEPHONE (OUTPATIENT)
Dept: SLEEP MEDICINE | Facility: CLINIC | Age: 51
End: 2021-07-01

## 2021-07-02 ENCOUNTER — OFFICE VISIT (OUTPATIENT)
Dept: SLEEP MEDICINE | Facility: CLINIC | Age: 51
End: 2021-07-02
Payer: COMMERCIAL

## 2021-07-02 DIAGNOSIS — D58.2 ELEVATED HEMOGLOBIN: ICD-10-CM

## 2021-07-02 DIAGNOSIS — E13.9 DIABETES MELLITUS DUE TO ABNORMAL INSULIN: ICD-10-CM

## 2021-07-02 DIAGNOSIS — D75.1 SECONDARY POLYCYTHEMIA: ICD-10-CM

## 2021-07-02 DIAGNOSIS — G47.33 OSA (OBSTRUCTIVE SLEEP APNEA): Primary | ICD-10-CM

## 2021-07-02 DIAGNOSIS — I10 ESSENTIAL HYPERTENSION: ICD-10-CM

## 2021-07-02 DIAGNOSIS — E66.9 OBESITY, CLASS II, BMI 35-39.9: ICD-10-CM

## 2021-07-02 PROCEDURE — 3051F PR MOST RECENT HEMOGLOBIN A1C LEVEL 7.0 - < 8.0%: ICD-10-PCS | Mod: CPTII,,, | Performed by: INTERNAL MEDICINE

## 2021-07-02 PROCEDURE — 99213 OFFICE O/P EST LOW 20 MIN: CPT | Mod: 95,,, | Performed by: INTERNAL MEDICINE

## 2021-07-02 PROCEDURE — 3051F HG A1C>EQUAL 7.0%<8.0%: CPT | Mod: CPTII,,, | Performed by: INTERNAL MEDICINE

## 2021-07-02 PROCEDURE — 99213 PR OFFICE/OUTPT VISIT, EST, LEVL III, 20-29 MIN: ICD-10-PCS | Mod: 95,,, | Performed by: INTERNAL MEDICINE

## 2021-07-06 ENCOUNTER — PATIENT MESSAGE (OUTPATIENT)
Dept: ADMINISTRATIVE | Facility: HOSPITAL | Age: 51
End: 2021-07-06

## 2021-07-07 ENCOUNTER — PATIENT MESSAGE (OUTPATIENT)
Dept: ADMINISTRATIVE | Facility: HOSPITAL | Age: 51
End: 2021-07-07

## 2021-09-13 ENCOUNTER — PATIENT MESSAGE (OUTPATIENT)
Dept: INTERNAL MEDICINE | Facility: CLINIC | Age: 51
End: 2021-09-13

## 2021-09-13 DIAGNOSIS — E13.9 DIABETES MELLITUS DUE TO ABNORMAL INSULIN: Primary | ICD-10-CM

## 2021-09-16 LAB
ALBUMIN: 4.7 GRAM/DL (ref 3.5–5)
ALP SERPL-CCNC: 93 UNIT/L (ref 38–126)
ALT SERPL W P-5'-P-CCNC: 26 UNIT/L (ref 7–56)
ANION GAP SERPL CALC-SCNC: 17 MEQ/L (ref 9–18)
AST SERPL-CCNC: 18 UNIT/L (ref 7–40)
BASOPHILS ABSOLUTE COUNT: 0.1 K/UL (ref 0–0.2)
BASOPHILS NFR BLD: 0.6 % (ref 0–2)
BILIRUB SERPL-MCNC: 0.6 MG/DL (ref 0–1.2)
BUN BLD-MCNC: 12 MG/DL (ref 7–21)
BUN/CREAT SERPL: 20 RATIO (ref 6–22)
CALC OSMOLALITY: 280 MOSM/KG (ref 275–295)
CALCIUM SERPL-MCNC: 9.2 MG/DL (ref 8.5–10.4)
CHLORIDE SERPL-SCNC: 99 MEQ/L (ref 98–107)
CO2 SERPL-SCNC: 25 MEQ/L (ref 21–31)
CREAT SERPL-MCNC: 0.6 MG/DL (ref 0.5–1)
DIFFERENTIAL TYPE: ABNORMAL
EOSINOPHIL NFR BLD: 2.6 % (ref 0–4)
EOSINOPHILS ABSOLUTE COUNT: 0.2 K/UL (ref 0–0.7)
ERYTHROCYTE [DISTWIDTH] IN BLOOD BY AUTOMATED COUNT: 13.3 % (ref 12–15.3)
GFR: 105 ML/MIN/1.73M2
GLUCOSE SERPL-MCNC: 213 MG/DL (ref 70–100)
HBA1C MFR BLD: 8.3 % (ref 4.5–5.7)
HCT VFR BLD AUTO: 45.7 % (ref 37–47)
HGB BLD-MCNC: 15.6 GRAM/DL (ref 12–16)
LYMPHOCYTES %: 27 % (ref 15–45)
LYMPHOCYTES ABSOLUTE COUNT: 2.4 K/UL (ref 1–4.2)
MCH RBC QN AUTO: 29.5 PICOGRAM (ref 27–33)
MCHC RBC AUTO-ENTMCNC: 34.1 GRAM/DL (ref 32–36)
MCV RBC AUTO: 86.4 FEMTOLITER (ref 81–99)
MONOCYTES %: 8.1 % (ref 3–13)
MONOCYTES ABSOLUTE COUNT: 0.7 K/UL (ref 0.1–0.8)
NEUTROPHILS ABSOLUTE COUNT: 5.5 K/UL (ref 2.1–7.6)
NEUTROPHILS RELATIVE PERCENT: 61.7 % (ref 32–80)
PLATELET # BLD AUTO: 224 K/UL (ref 150–350)
PMV BLD AUTO: 10.7 FEMTOLITER (ref 7–10.2)
POTASSIUM SERPL-SCNC: 3.8 MEQ/L (ref 3.5–5)
RBC # BLD AUTO: 5.29 MIL/UL (ref 4.2–5.4)
SODIUM BLD-SCNC: 137 MEQ/L (ref 135–145)
TOTAL PROTEIN: 6.8 GRAM/DL (ref 6.3–8.2)
WBC # BLD AUTO: 8.9 K/UL (ref 4.5–11)

## 2021-09-20 ENCOUNTER — OFFICE VISIT (OUTPATIENT)
Dept: INTERNAL MEDICINE | Facility: CLINIC | Age: 51
End: 2021-09-20
Payer: COMMERCIAL

## 2021-09-20 ENCOUNTER — TELEPHONE (OUTPATIENT)
Dept: PHARMACY | Facility: CLINIC | Age: 51
End: 2021-09-20

## 2021-09-20 ENCOUNTER — IMMUNIZATION (OUTPATIENT)
Dept: PHARMACY | Facility: CLINIC | Age: 51
End: 2021-09-20
Payer: COMMERCIAL

## 2021-09-20 VITALS
HEART RATE: 64 BPM | DIASTOLIC BLOOD PRESSURE: 82 MMHG | OXYGEN SATURATION: 99 % | SYSTOLIC BLOOD PRESSURE: 114 MMHG | BODY MASS INDEX: 35.7 KG/M2 | HEIGHT: 63 IN | WEIGHT: 201.5 LBS

## 2021-09-20 DIAGNOSIS — I10 ESSENTIAL HYPERTENSION: ICD-10-CM

## 2021-09-20 DIAGNOSIS — E13.9 DIABETES MELLITUS DUE TO ABNORMAL INSULIN: Primary | ICD-10-CM

## 2021-09-20 DIAGNOSIS — G47.33 OSA (OBSTRUCTIVE SLEEP APNEA): ICD-10-CM

## 2021-09-20 DIAGNOSIS — Z23 NEED FOR VACCINATION: Primary | ICD-10-CM

## 2021-09-20 PROCEDURE — 3079F DIAST BP 80-89 MM HG: CPT | Mod: CPTII,S$GLB,, | Performed by: INTERNAL MEDICINE

## 2021-09-20 PROCEDURE — 99999 PR PBB SHADOW E&M-EST. PATIENT-LVL IV: CPT | Mod: PBBFAC,,, | Performed by: INTERNAL MEDICINE

## 2021-09-20 PROCEDURE — 3052F HG A1C>EQUAL 8.0%<EQUAL 9.0%: CPT | Mod: CPTII,S$GLB,, | Performed by: INTERNAL MEDICINE

## 2021-09-20 PROCEDURE — 3008F PR BODY MASS INDEX (BMI) DOCUMENTED: ICD-10-PCS | Mod: CPTII,S$GLB,, | Performed by: INTERNAL MEDICINE

## 2021-09-20 PROCEDURE — 3052F PR MOST RECENT HEMOGLOBIN A1C LEVEL 8.0 - < 9.0%: ICD-10-PCS | Mod: CPTII,S$GLB,, | Performed by: INTERNAL MEDICINE

## 2021-09-20 PROCEDURE — 3008F BODY MASS INDEX DOCD: CPT | Mod: CPTII,S$GLB,, | Performed by: INTERNAL MEDICINE

## 2021-09-20 PROCEDURE — 3074F SYST BP LT 130 MM HG: CPT | Mod: CPTII,S$GLB,, | Performed by: INTERNAL MEDICINE

## 2021-09-20 PROCEDURE — 3079F PR MOST RECENT DIASTOLIC BLOOD PRESSURE 80-89 MM HG: ICD-10-PCS | Mod: CPTII,S$GLB,, | Performed by: INTERNAL MEDICINE

## 2021-09-20 PROCEDURE — 99214 OFFICE O/P EST MOD 30 MIN: CPT | Mod: S$GLB,,, | Performed by: INTERNAL MEDICINE

## 2021-09-20 PROCEDURE — 1159F PR MEDICATION LIST DOCUMENTED IN MEDICAL RECORD: ICD-10-PCS | Mod: CPTII,S$GLB,, | Performed by: INTERNAL MEDICINE

## 2021-09-20 PROCEDURE — 99214 PR OFFICE/OUTPT VISIT, EST, LEVL IV, 30-39 MIN: ICD-10-PCS | Mod: S$GLB,,, | Performed by: INTERNAL MEDICINE

## 2021-09-20 PROCEDURE — 3074F PR MOST RECENT SYSTOLIC BLOOD PRESSURE < 130 MM HG: ICD-10-PCS | Mod: CPTII,S$GLB,, | Performed by: INTERNAL MEDICINE

## 2021-09-20 PROCEDURE — 99999 PR PBB SHADOW E&M-EST. PATIENT-LVL IV: ICD-10-PCS | Mod: PBBFAC,,, | Performed by: INTERNAL MEDICINE

## 2021-09-20 PROCEDURE — 1159F MED LIST DOCD IN RCRD: CPT | Mod: CPTII,S$GLB,, | Performed by: INTERNAL MEDICINE

## 2021-09-20 RX ORDER — DULAGLUTIDE 0.75 MG/.5ML
0.75 INJECTION, SOLUTION SUBCUTANEOUS
Qty: 4 PEN | Refills: 0 | Status: SHIPPED | OUTPATIENT
Start: 2021-09-20 | End: 2022-04-29

## 2021-09-20 RX ORDER — DULAGLUTIDE 1.5 MG/.5ML
1.5 INJECTION, SOLUTION SUBCUTANEOUS
Qty: 4 PEN | Refills: 11 | Status: SHIPPED | OUTPATIENT
Start: 2021-09-20 | End: 2022-10-10

## 2021-10-04 ENCOUNTER — PATIENT MESSAGE (OUTPATIENT)
Dept: ADMINISTRATIVE | Facility: HOSPITAL | Age: 51
End: 2021-10-04

## 2021-10-08 ENCOUNTER — PATIENT MESSAGE (OUTPATIENT)
Dept: INTERNAL MEDICINE | Facility: CLINIC | Age: 51
End: 2021-10-08

## 2021-10-08 DIAGNOSIS — E11.9 TYPE 2 DIABETES MELLITUS WITHOUT COMPLICATION, WITHOUT LONG-TERM CURRENT USE OF INSULIN: ICD-10-CM

## 2021-10-08 RX ORDER — LANCETS
1 EACH MISCELLANEOUS 2 TIMES DAILY WITH MEALS
Qty: 100 EACH | Refills: 3 | Status: SHIPPED | OUTPATIENT
Start: 2021-10-08

## 2021-12-13 LAB
ALBUMIN: 4.7 GRAM/DL (ref 3.5–5)
ALP SERPL-CCNC: 88 UNIT/L (ref 38–126)
ALT SERPL W P-5'-P-CCNC: 23 UNIT/L (ref 7–56)
ANION GAP SERPL CALC-SCNC: 30 MEQ/L (ref 9–18)
AST SERPL-CCNC: 17 UNIT/L (ref 7–40)
BASOPHILS ABSOLUTE COUNT: 0 K/UL (ref 0–0.2)
BASOPHILS NFR BLD: 0.4 % (ref 0–2)
BILIRUB SERPL-MCNC: 0.8 MG/DL (ref 0–1.2)
BUN BLD-MCNC: 9 MG/DL (ref 7–21)
BUN/CREAT SERPL: 15 RATIO (ref 6–22)
CALC OSMOLALITY: 301 MOSM/KG (ref 275–295)
CALCIUM SERPL-MCNC: 9 MG/DL (ref 8.5–10.4)
CHLORIDE SERPL-SCNC: 94 MEQ/L (ref 98–107)
CHOL/HDLC RATIO: 3
CHOLEST SERPL-MSCNC: 173 MG/DL (ref 100–200)
CO2 SERPL-SCNC: 27 MEQ/L (ref 21–31)
CREAT SERPL-MCNC: 0.6 MG/DL (ref 0.5–1)
DIFFERENTIAL TYPE: ABNORMAL
EOSINOPHIL NFR BLD: 2.3 % (ref 0–4)
EOSINOPHILS ABSOLUTE COUNT: 0.2 K/UL (ref 0–0.7)
ERYTHROCYTE [DISTWIDTH] IN BLOOD BY AUTOMATED COUNT: 13.4 % (ref 12–15.3)
GFR: 105 ML/MIN/1.73M2
GLUCOSE SERPL-MCNC: 282 MG/DL (ref 70–100)
HBA1C MFR BLD: 7.2 % (ref 4.5–5.7)
HCT VFR BLD AUTO: 45.8 % (ref 37–47)
HDLC SERPL-MCNC: 54 MG/DL (ref 40–75)
HGB BLD-MCNC: 15.5 GRAM/DL (ref 12–16)
LDLC SERPL CALC-MCNC: 93 MG/DL (ref 0–125)
LYMPHOCYTES %: 28.1 % (ref 15–45)
LYMPHOCYTES ABSOLUTE COUNT: 2.2 K/UL (ref 1–4.2)
MCH RBC QN AUTO: 29.6 PICOGRAM (ref 27–33)
MCHC RBC AUTO-ENTMCNC: 33.8 GRAM/DL (ref 32–36)
MCV RBC AUTO: 87.6 FEMTOLITER (ref 81–99)
MONOCYTES %: 7.4 % (ref 3–13)
MONOCYTES ABSOLUTE COUNT: 0.6 K/UL (ref 0.1–0.8)
NEUTROPHILS ABSOLUTE COUNT: 4.9 K/UL (ref 2.1–7.6)
NEUTROPHILS RELATIVE PERCENT: 61.8 % (ref 32–80)
NONHDLC SERPL-MCNC: 119 MG/DL (ref 60–125)
PLATELET # BLD AUTO: 224 K/UL (ref 150–350)
PMV BLD AUTO: 10.3 FEMTOLITER (ref 7–10.2)
POTASSIUM SERPL-SCNC: 3.9 MEQ/L (ref 3.5–5)
RBC # BLD AUTO: 5.23 MIL/UL (ref 4.2–5.4)
SODIUM BLD-SCNC: 147 MEQ/L (ref 135–145)
TOTAL PROTEIN: 6.9 GRAM/DL (ref 6.3–8.2)
TRIGL SERPL-MCNC: 160 MG/DL (ref 30–150)
TSH SERPL DL<=0.005 MIU/L-ACNC: 1.73 UIL/ML (ref 0.35–4)
WBC # BLD AUTO: 7.9 K/UL (ref 4.5–11)

## 2022-01-02 ENCOUNTER — PATIENT MESSAGE (OUTPATIENT)
Dept: ADMINISTRATIVE | Facility: HOSPITAL | Age: 52
End: 2022-01-02
Payer: COMMERCIAL

## 2022-01-10 ENCOUNTER — LAB VISIT (OUTPATIENT)
Dept: PRIMARY CARE CLINIC | Facility: CLINIC | Age: 52
End: 2022-01-10
Payer: COMMERCIAL

## 2022-01-10 DIAGNOSIS — Z20.822 CONTACT WITH AND (SUSPECTED) EXPOSURE TO COVID-19: ICD-10-CM

## 2022-01-10 LAB
CTP QC/QA: YES
SARS-COV-2 AG RESP QL IA.RAPID: POSITIVE

## 2022-01-10 PROCEDURE — 87811 SARS-COV-2 COVID19 W/OPTIC: CPT

## 2022-01-15 DIAGNOSIS — U07.1 COVID-19 VIRUS DETECTED: ICD-10-CM

## 2022-01-19 ENCOUNTER — PATIENT MESSAGE (OUTPATIENT)
Dept: ADMINISTRATIVE | Facility: HOSPITAL | Age: 52
End: 2022-01-19
Payer: COMMERCIAL

## 2022-01-19 DIAGNOSIS — E11.9 TYPE 2 DIABETES MELLITUS WITHOUT COMPLICATION, UNSPECIFIED WHETHER LONG TERM INSULIN USE: ICD-10-CM

## 2022-01-19 DIAGNOSIS — Z12.11 COLON CANCER SCREENING: ICD-10-CM

## 2022-02-14 ENCOUNTER — PATIENT MESSAGE (OUTPATIENT)
Dept: RESEARCH | Facility: HOSPITAL | Age: 52
End: 2022-02-14
Payer: COMMERCIAL

## 2022-03-08 DIAGNOSIS — I10 ESSENTIAL HYPERTENSION: ICD-10-CM

## 2022-03-08 NOTE — TELEPHONE ENCOUNTER
No new care gaps identified.  Powered by [employer]. Reference number: [id number].   3/08/2022 6:34:33 AM CST

## 2022-03-14 DIAGNOSIS — Z12.11 COLON CANCER SCREENING: ICD-10-CM

## 2022-03-16 ENCOUNTER — PATIENT MESSAGE (OUTPATIENT)
Dept: ADMINISTRATIVE | Facility: HOSPITAL | Age: 52
End: 2022-03-16
Payer: COMMERCIAL

## 2022-03-16 RX ORDER — AMLODIPINE BESYLATE 10 MG/1
TABLET ORAL
Qty: 90 TABLET | Refills: 3 | OUTPATIENT
Start: 2022-03-16

## 2022-04-10 ENCOUNTER — PATIENT MESSAGE (OUTPATIENT)
Dept: INTERNAL MEDICINE | Facility: CLINIC | Age: 52
End: 2022-04-10
Payer: COMMERCIAL

## 2022-04-10 DIAGNOSIS — E66.9 OBESITY, CLASS II, BMI 35-39.9: Primary | ICD-10-CM

## 2022-04-20 DIAGNOSIS — E11.9 TYPE 2 DIABETES MELLITUS WITHOUT COMPLICATION, UNSPECIFIED WHETHER LONG TERM INSULIN USE: ICD-10-CM

## 2022-04-22 LAB
HBA1C MFR BLD: 7.5 % (ref 4.5–5.7)
TSH SERPL DL<=0.005 MIU/L-ACNC: 1.53 UIU/ML (ref 0.35–4)

## 2022-04-29 ENCOUNTER — OFFICE VISIT (OUTPATIENT)
Dept: INTERNAL MEDICINE | Facility: CLINIC | Age: 52
End: 2022-04-29
Payer: COMMERCIAL

## 2022-04-29 VITALS
HEART RATE: 92 BPM | DIASTOLIC BLOOD PRESSURE: 62 MMHG | OXYGEN SATURATION: 97 % | HEIGHT: 62 IN | BODY MASS INDEX: 36.53 KG/M2 | WEIGHT: 198.5 LBS | SYSTOLIC BLOOD PRESSURE: 118 MMHG

## 2022-04-29 DIAGNOSIS — E13.9 DIABETES MELLITUS DUE TO ABNORMAL INSULIN: Primary | ICD-10-CM

## 2022-04-29 DIAGNOSIS — I10 ESSENTIAL HYPERTENSION: ICD-10-CM

## 2022-04-29 DIAGNOSIS — G47.33 OSA (OBSTRUCTIVE SLEEP APNEA): ICD-10-CM

## 2022-04-29 DIAGNOSIS — D58.2 ELEVATED HEMOGLOBIN: ICD-10-CM

## 2022-04-29 PROCEDURE — 3074F SYST BP LT 130 MM HG: CPT | Mod: CPTII,S$GLB,, | Performed by: INTERNAL MEDICINE

## 2022-04-29 PROCEDURE — 99999 PR PBB SHADOW E&M-EST. PATIENT-LVL III: CPT | Mod: PBBFAC,,, | Performed by: INTERNAL MEDICINE

## 2022-04-29 PROCEDURE — 1159F PR MEDICATION LIST DOCUMENTED IN MEDICAL RECORD: ICD-10-PCS | Mod: CPTII,S$GLB,, | Performed by: INTERNAL MEDICINE

## 2022-04-29 PROCEDURE — 1159F MED LIST DOCD IN RCRD: CPT | Mod: CPTII,S$GLB,, | Performed by: INTERNAL MEDICINE

## 2022-04-29 PROCEDURE — 3008F BODY MASS INDEX DOCD: CPT | Mod: CPTII,S$GLB,, | Performed by: INTERNAL MEDICINE

## 2022-04-29 PROCEDURE — 99214 PR OFFICE/OUTPT VISIT, EST, LEVL IV, 30-39 MIN: ICD-10-PCS | Mod: S$GLB,,, | Performed by: INTERNAL MEDICINE

## 2022-04-29 PROCEDURE — 3078F PR MOST RECENT DIASTOLIC BLOOD PRESSURE < 80 MM HG: ICD-10-PCS | Mod: CPTII,S$GLB,, | Performed by: INTERNAL MEDICINE

## 2022-04-29 PROCEDURE — 3051F HG A1C>EQUAL 7.0%<8.0%: CPT | Mod: CPTII,S$GLB,, | Performed by: INTERNAL MEDICINE

## 2022-04-29 PROCEDURE — 3008F PR BODY MASS INDEX (BMI) DOCUMENTED: ICD-10-PCS | Mod: CPTII,S$GLB,, | Performed by: INTERNAL MEDICINE

## 2022-04-29 PROCEDURE — 99214 OFFICE O/P EST MOD 30 MIN: CPT | Mod: S$GLB,,, | Performed by: INTERNAL MEDICINE

## 2022-04-29 PROCEDURE — 3078F DIAST BP <80 MM HG: CPT | Mod: CPTII,S$GLB,, | Performed by: INTERNAL MEDICINE

## 2022-04-29 PROCEDURE — 3051F PR MOST RECENT HEMOGLOBIN A1C LEVEL 7.0 - < 8.0%: ICD-10-PCS | Mod: CPTII,S$GLB,, | Performed by: INTERNAL MEDICINE

## 2022-04-29 PROCEDURE — 99999 PR PBB SHADOW E&M-EST. PATIENT-LVL III: ICD-10-PCS | Mod: PBBFAC,,, | Performed by: INTERNAL MEDICINE

## 2022-04-29 PROCEDURE — 3074F PR MOST RECENT SYSTOLIC BLOOD PRESSURE < 130 MM HG: ICD-10-PCS | Mod: CPTII,S$GLB,, | Performed by: INTERNAL MEDICINE

## 2022-04-29 RX ORDER — METFORMIN HYDROCHLORIDE 500 MG/1
1000 TABLET, EXTENDED RELEASE ORAL DAILY
Qty: 60 TABLET | Refills: 11 | Status: SHIPPED | OUTPATIENT
Start: 2022-04-29 | End: 2022-09-23

## 2022-04-29 NOTE — PROGRESS NOTES
Chief Complaint:  Follow up of blood pressure, blood sugar, mood disorder     HPI:This is a 51 year old woman who presents for follow up of above.     She has been taking less Trulicity 1.5 mg once every 2 weeks.         She has a CPAP machine - she wears 5-7 a night. She wears it every night.  She is not tossing as turning as much. She does have the restless leg at night.  She feels more rested with wearing the CPAP machine.         She has been taking amlodipine 10 mg daily in the evening.  No leg swelling, chest pain, shortness of breath.       She continues to be tired.  She will take diazepam 5 mg at bedtime if she knows she cannot sleep (very infrequent). Took one in the last month.     She has been off LExapro.  She feels ok. Family stress is much different (better).   She feels that she is coping ok with current stressors.      No period since 2021     She had a colonoscopy at [facility] years ago with robyn Hall's group - Alex shelton.  due to hemorrhoid issues.      REVIEW OF SYSTEMS: She denies fevers, chills, night sweats, visual change, hearing loss, sinus congestion, sore throat, chest pain, shortness of breath, nausea, vomiting, constipation, diarrhea, dysuria, hematuria, polydipsia, polyuria, joint pain, muscle pain, headaches,               Past Medical History:   Diagnosis Date    Abnormal Pap smear of cervix 2019     colpo benign    Gestational hypertension      HTN (hypertension)                 Past Surgical History:   Procedure Laterality Date    acl replacement Right       x 2    ANTERIOR CRUCIATE LIGAMENT REPAIR Right       SECTION         2    COLPOSCOPY   2019     Stephani Noriega MD    DILATION AND CURETTAGE OF UTERUS        UTERINE FIBROID EMBOLIZATION          Social History                Socioeconomic History    Marital status:        Spouse name: Not on file    Number of children: Not on file    Years of education: Not on file  "   Highest education level: Not on file   Occupational History    Not on file   Social Needs    Financial resource strain: Hard    Food insecurity:       Worry: Sometimes true       Inability: Sometimes true    Transportation needs:       Medical: No       Non-medical: No   Tobacco Use    Smoking status: Former Smoker       Packs/day: 1.00       Years: 19.00       Pack years: 19.00       Last attempt to quit: 2006       Years since quittin.1    Smokeless tobacco: Never Used   Substance and Sexual Activity    Alcohol use: Yes       Alcohol/week: 4.0 standard drinks       Types: 4 Glasses of wine per week       Comment: one drink 5 times a week    Drug use: No    Sexual activity: Yes       Partners: Male       Comment: , 2 kids   Lifestyle    Physical activity:       Days per week: 2 days       Minutes per session: 40 min    Stress: Very much   Relationships    Social connections:       Talks on phone: Three times a week       Gets together: Once a week       Attends Mormonism service: Not on file       Active member of club or organization: Yes       Attends meetings of clubs or organizations: More than 4 times per year       Relationship status:    Other Topics Concern    Not on file   Social History Narrative     Lives with  and children. Son with DM1, daughter with chromasomal disorder.     Working with  on increasing exercise     Generally very healthy diet, low-carb     Adopted                     Family Status   Relation Name Status    Mother Rosemary Aguilar Alive    Father Curtis Trotter Alive    Brother half sib Alive    Son Vikas Alive    Sister half sib Alive    MGM Eden Aguilar (Not Specified)    PGM Corine Trotter (Not Specified)    PGF Bubba Trotter (Not Specified)    Daughter Amanda Alive    Pat Uncle Alejandro Trotter (Not Specified)            Meds and allergies: updated on [facility]   /62   Pulse 92   Ht 5' 2" (1.575 m) "   Wt 90.1 kg (198 lb 8.4 oz)   SpO2 97%   BMI 36.31 kg/m²      General: alert, oriented x 3, no apparent distress.  Affect normal  HEENT: Conjunctivae: anicteric, PERRL, EOMI, TM clear, Oralpharynx clear  Neck: supple, no thyroid enlargement, no cervical lymphadenopathy  Resp: effort normal, lungs clear bilaterally  CV: Regular rate and rhythm without murmurs, gallops or rubs, no lower extremity edema,     Protective Sensation (w/ 10 gram monofilament):  Right: Intact  Left: Intact    Visual Inspection:  Normal -  Bilateral       Pedal Pulses:   Right: Present  Left: Present    Posterior tibialis:   Right:Present  Left: Present               Labs -4/12/22     Assessment/Plan:      1. Hypertension - controlled  2. Diabetes -  stable on Trulicity 1.5 sq nightly and metformin  mg 2 in evening.   3. Mood disorder-   better off lexapro.   4. Insomnia and restless legs- better  5. Obesity - discussed diet and exercise.   6. Vitamin D deficiency - vitamin D 2000 units daily    7. Erythrocytosis - on cpap machine - repeat CBC    8. Sleep apnea - on cpap     MMG  9/22/2021    GYN - followed by Dr Noriega     Discussed colonoscopy - will do at [facility].      I will see her back in 3 months, Sooner if problems arise       Answers for HPI/ROS submitted by the patient on 4/27/2022  Diabetes type: type 2  MedicAlert ID: No  Disease duration: 2 years  blurred vision: No  chest pain: No  fatigue: No  foot paresthesias: No  foot ulcerations: No  polydipsia: No  polyphagia: No  polyuria: No  visual change: No  weakness: No  weight loss: No  Symptom course: stable  confusion: No  dizziness: No  headaches: No  hunger: No  mood changes: No  nervous/anxious: No  pallor: No  seizures: No  sleepiness: No  speech difficulty: No  sweats: No  tremors: No  blackouts: No  hospitalization: No  nocturnal hypoglycemia: No  required assistance: No  required glucagon: No  CVA: No  heart disease: No  nephropathy: No  peripheral neuropathy:  No  PVD: No  retinopathy: No  autonomic neuropathy: No  CAD risks: hypertension, obesity, stress, diabetes mellitus  Current treatments: diet, oral agent (dual therapy)  Treatment compliance: most of the time  Given by: patient  Injection sites: abdominal wall  Home blood tests: 3-4 x per week  Monitoring compliance: good  Blood glucose trend: fluctuating minimally  Weight trend: stable  Current diet: generally healthy  Meal planning: avoidance of concentrated sweets, carbohydrate counting  Exercise: rarely  Dietitian visit: No  Eye exam current: No  Sees podiatrist: No

## 2022-05-04 LAB
ALBUMIN: 4.5 G/DL (ref 3.5–5)
ALP SERPL-CCNC: 87 U/L (ref 38–126)
ALT SERPL W P-5'-P-CCNC: 20 U/L (ref 7–56)
ANION GAP SERPL CALC-SCNC: 17.9 MMOL/L (ref 9–18)
AST SERPL-CCNC: 16 U/L (ref 7–40)
BASOPHILS ABSOLUTE COUNT: 0 THOUSAND/UL (ref 0–0.2)
BASOPHILS NFR BLD: 0.2 % (ref 0–2)
BILIRUB SERPL-MCNC: 0.7 MG/DL (ref 0–1.2)
BUN BLD-MCNC: 10 MG/DL (ref 7–21)
BUN/CREAT SERPL: 19 (ref 6–22)
CALC OSMOLALITY: 288 MOSM/KG (ref 275–295)
CALCIUM SERPL-MCNC: 8.8 MG/DL (ref 8.5–10.4)
CHLORIDE SERPL-SCNC: 101 MMOL/L (ref 98–107)
CO2 SERPL-SCNC: 28 MMOL/L (ref 21–31)
CREAT SERPL-MCNC: 0.54 MG/DL (ref 0.5–1)
EGFR: 126 ML/MIN
EOSINOPHIL NFR BLD: 2.2 % (ref 0–4)
EOSINOPHILS ABSOLUTE COUNT: 0.2 THOUSAND/UL (ref 0–0.7)
ERYTHROCYTE [DISTWIDTH] IN BLOOD BY AUTOMATED COUNT: 13.2 % (ref 12–15.3)
GFR: 108 ML/MIN
GLUCOSE SERPL-MCNC: 176 MG/DL (ref 70–100)
HCT VFR BLD AUTO: 45.1 % (ref 37–47)
HGB BLD-MCNC: 15.3 GM/DL (ref 12–16)
LYMPHOCYTES %: 23.5 % (ref 15–45)
LYMPHOCYTES ABSOLUTE COUNT: 2 THOUSAND/UL (ref 1–4.2)
MCH RBC QN AUTO: 29.2 PG (ref 27–33)
MCHC RBC AUTO-ENTMCNC: 33.9 G/DL (ref 32–36)
MCV RBC AUTO: 86.1 FL (ref 81–99)
MONOCYTES %: 7.6 % (ref 3–13)
MONOCYTES ABSOLUTE COUNT: 0.6 THOUSAND/UL (ref 0.1–0.8)
NEUTROPHILS ABSOLUTE COUNT: 5.6 THOUSAND/UL (ref 2.1–7.6)
NEUTROPHILS RELATIVE PERCENT: 66.5 % (ref 32–80)
PLATELET # BLD AUTO: 225 THOUSAND/UL (ref 150–350)
PMV BLD AUTO: 10.4 FL (ref 7–10.2)
POTASSIUM SERPL-SCNC: 3.9 MMOL/L (ref 3.5–5)
RBC # BLD AUTO: 5.24 MILLION/UL (ref 4.2–5.4)
SODIUM BLD-SCNC: 143 MMOL/L (ref 135–145)
TOTAL PROTEIN: 6.6 G/DL (ref 6.3–8.2)
WBC # BLD AUTO: 8.4 THOUSAND/UL (ref 4.5–11)

## 2022-05-08 ENCOUNTER — PATIENT MESSAGE (OUTPATIENT)
Dept: INTERNAL MEDICINE | Facility: CLINIC | Age: 52
End: 2022-05-08
Payer: COMMERCIAL

## 2022-06-01 ENCOUNTER — PATIENT MESSAGE (OUTPATIENT)
Dept: ADMINISTRATIVE | Facility: HOSPITAL | Age: 52
End: 2022-06-01
Payer: COMMERCIAL

## 2022-07-13 ENCOUNTER — PATIENT MESSAGE (OUTPATIENT)
Dept: ADMINISTRATIVE | Facility: HOSPITAL | Age: 52
End: 2022-07-13
Payer: COMMERCIAL

## 2022-07-14 ENCOUNTER — PATIENT MESSAGE (OUTPATIENT)
Dept: INTERNAL MEDICINE | Facility: CLINIC | Age: 52
End: 2022-07-14
Payer: COMMERCIAL

## 2022-07-14 DIAGNOSIS — F43.0 STRESS REACTION: Primary | ICD-10-CM

## 2022-07-14 RX ORDER — ESCITALOPRAM OXALATE 5 MG/1
5 TABLET ORAL DAILY
Qty: 30 TABLET | Refills: 2 | Status: SHIPPED | OUTPATIENT
Start: 2022-07-14 | End: 2022-09-23 | Stop reason: SDUPTHER

## 2022-09-09 ENCOUNTER — PATIENT MESSAGE (OUTPATIENT)
Dept: INTERNAL MEDICINE | Facility: CLINIC | Age: 52
End: 2022-09-09
Payer: COMMERCIAL

## 2022-09-09 DIAGNOSIS — E13.9 DIABETES MELLITUS DUE TO ABNORMAL INSULIN: Primary | ICD-10-CM

## 2022-09-16 LAB
ALBUMIN: 4.6 G/DL (ref 3.5–5)
ALP SERPL-CCNC: 95 U/L (ref 38–126)
ALT SERPL W P-5'-P-CCNC: 21 U/L (ref 7–56)
ANION GAP SERPL CALC-SCNC: 16.2 MMOL/L (ref 9–18)
AST SERPL-CCNC: 18 U/L (ref 7–40)
BASOPHILS ABSOLUTE COUNT: 0 THOUSAND/UL (ref 0–0.2)
BASOPHILS NFR BLD: 0.4 % (ref 0–2)
BILIRUB SERPL-MCNC: 0.7 MG/DL (ref 0–1.2)
BUN BLD-MCNC: 15 MG/DL (ref 7–21)
BUN/CREAT SERPL: 28 (ref 6–22)
CALC OSMOLALITY: 290 MOSM/KG (ref 275–295)
CALCIUM SERPL-MCNC: 9.3 MG/DL (ref 8.5–10.4)
CHLORIDE SERPL-SCNC: 103 MMOL/L (ref 98–107)
CO2 SERPL-SCNC: 28 MMOL/L (ref 21–31)
CREAT SERPL-MCNC: 0.53 MG/DL (ref 0.5–1)
EGFR: 126 ML/MIN
EOSINOPHIL NFR BLD: 2.4 % (ref 0–4)
EOSINOPHILS ABSOLUTE COUNT: 0.2 THOUSAND/UL (ref 0–0.7)
ERYTHROCYTE [DISTWIDTH] IN BLOOD BY AUTOMATED COUNT: 13 % (ref 12–15.3)
GFR: 108 ML/MIN
GLUCOSE SERPL-MCNC: 170 MG/DL (ref 70–100)
HBA1C MFR BLD: 7.4 % (ref 4.5–5.7)
HCT VFR BLD AUTO: 45.5 % (ref 37–47)
HGB BLD-MCNC: 15.6 GM/DL (ref 12–16)
LYMPHOCYTES %: 31.6 % (ref 15–45)
LYMPHOCYTES ABSOLUTE COUNT: 2.3 THOUSAND/UL (ref 1–4.2)
MCH RBC QN AUTO: 29.5 PG (ref 27–33)
MCHC RBC AUTO-ENTMCNC: 34.2 G/DL (ref 32–36)
MCV RBC AUTO: 86 FL (ref 81–99)
MONOCYTES %: 9.8 % (ref 3–13)
MONOCYTES ABSOLUTE COUNT: 0.7 THOUSAND/UL (ref 0.1–0.8)
NEUTROPHILS ABSOLUTE COUNT: 4.1 THOUSAND/UL (ref 2.1–7.6)
NEUTROPHILS RELATIVE PERCENT: 55.8 % (ref 32–80)
PLATELET # BLD AUTO: 234 THOUSAND/UL (ref 150–350)
PMV BLD AUTO: 9.8 FL (ref 7–10.2)
POTASSIUM SERPL-SCNC: 4.2 MMOL/L (ref 3.5–5)
RBC # BLD AUTO: 5.29 MILLION/UL (ref 4.2–5.4)
SODIUM BLD-SCNC: 143 MMOL/L (ref 135–145)
TOTAL PROTEIN: 6.9 G/DL (ref 6.3–8.2)
WBC # BLD AUTO: 7.4 THOUSAND/UL (ref 4.5–11)

## 2022-09-23 ENCOUNTER — OFFICE VISIT (OUTPATIENT)
Dept: INTERNAL MEDICINE | Facility: CLINIC | Age: 52
End: 2022-09-23
Payer: COMMERCIAL

## 2022-09-23 VITALS
OXYGEN SATURATION: 100 % | HEIGHT: 62 IN | BODY MASS INDEX: 36.07 KG/M2 | SYSTOLIC BLOOD PRESSURE: 128 MMHG | HEART RATE: 72 BPM | WEIGHT: 196 LBS | DIASTOLIC BLOOD PRESSURE: 80 MMHG

## 2022-09-23 DIAGNOSIS — F39 UNSPECIFIED MOOD (AFFECTIVE) DISORDER: ICD-10-CM

## 2022-09-23 DIAGNOSIS — E66.01 SEVERE OBESITY (BMI 35.0-39.9) WITH COMORBIDITY: ICD-10-CM

## 2022-09-23 DIAGNOSIS — F43.0 STRESS REACTION: ICD-10-CM

## 2022-09-23 DIAGNOSIS — Z00.00 ROUTINE GENERAL MEDICAL EXAMINATION AT A HEALTH CARE FACILITY: Primary | ICD-10-CM

## 2022-09-23 PROCEDURE — 3074F PR MOST RECENT SYSTOLIC BLOOD PRESSURE < 130 MM HG: ICD-10-PCS | Mod: CPTII,S$GLB,, | Performed by: INTERNAL MEDICINE

## 2022-09-23 PROCEDURE — 3079F PR MOST RECENT DIASTOLIC BLOOD PRESSURE 80-89 MM HG: ICD-10-PCS | Mod: CPTII,S$GLB,, | Performed by: INTERNAL MEDICINE

## 2022-09-23 PROCEDURE — 3008F BODY MASS INDEX DOCD: CPT | Mod: CPTII,S$GLB,, | Performed by: INTERNAL MEDICINE

## 2022-09-23 PROCEDURE — 3074F SYST BP LT 130 MM HG: CPT | Mod: CPTII,S$GLB,, | Performed by: INTERNAL MEDICINE

## 2022-09-23 PROCEDURE — 3051F HG A1C>EQUAL 7.0%<8.0%: CPT | Mod: CPTII,S$GLB,, | Performed by: INTERNAL MEDICINE

## 2022-09-23 PROCEDURE — 3051F PR MOST RECENT HEMOGLOBIN A1C LEVEL 7.0 - < 8.0%: ICD-10-PCS | Mod: CPTII,S$GLB,, | Performed by: INTERNAL MEDICINE

## 2022-09-23 PROCEDURE — 99999 PR PBB SHADOW E&M-EST. PATIENT-LVL III: CPT | Mod: PBBFAC,,, | Performed by: INTERNAL MEDICINE

## 2022-09-23 PROCEDURE — 99396 PR PREVENTIVE VISIT,EST,40-64: ICD-10-PCS | Mod: S$GLB,,, | Performed by: INTERNAL MEDICINE

## 2022-09-23 PROCEDURE — 1159F MED LIST DOCD IN RCRD: CPT | Mod: CPTII,S$GLB,, | Performed by: INTERNAL MEDICINE

## 2022-09-23 PROCEDURE — 99396 PREV VISIT EST AGE 40-64: CPT | Mod: S$GLB,,, | Performed by: INTERNAL MEDICINE

## 2022-09-23 PROCEDURE — 3079F DIAST BP 80-89 MM HG: CPT | Mod: CPTII,S$GLB,, | Performed by: INTERNAL MEDICINE

## 2022-09-23 PROCEDURE — 3008F PR BODY MASS INDEX (BMI) DOCUMENTED: ICD-10-PCS | Mod: CPTII,S$GLB,, | Performed by: INTERNAL MEDICINE

## 2022-09-23 PROCEDURE — 99999 PR PBB SHADOW E&M-EST. PATIENT-LVL III: ICD-10-PCS | Mod: PBBFAC,,, | Performed by: INTERNAL MEDICINE

## 2022-09-23 PROCEDURE — 1159F PR MEDICATION LIST DOCUMENTED IN MEDICAL RECORD: ICD-10-PCS | Mod: CPTII,S$GLB,, | Performed by: INTERNAL MEDICINE

## 2022-09-23 RX ORDER — ESCITALOPRAM OXALATE 5 MG/1
5 TABLET ORAL DAILY
Qty: 90 TABLET | Refills: 4 | Status: SHIPPED | OUTPATIENT
Start: 2022-09-23 | End: 2022-09-23 | Stop reason: SDUPTHER

## 2022-09-23 RX ORDER — ESCITALOPRAM OXALATE 5 MG/1
5 TABLET ORAL DAILY
Qty: 90 TABLET | Refills: 4 | Status: SHIPPED | OUTPATIENT
Start: 2022-09-23 | End: 2023-09-14 | Stop reason: SDUPTHER

## 2022-09-23 RX ORDER — SEMAGLUTIDE 1.34 MG/ML
1 INJECTION, SOLUTION SUBCUTANEOUS
Qty: 1 PEN | Refills: 11 | Status: SHIPPED | OUTPATIENT
Start: 2022-09-23 | End: 2023-03-22

## 2022-09-23 NOTE — PROGRESS NOTES
Chief Complaint: Annual exam and  Follow up of blood pressure, blood sugar, mood disorder     HPI:This is a 52 year old woman who presents for follow up of above.     She is taking Trulicity 1.5 mg SQ every 2 weeks due to insurance cost- Costs $200 per pen.  Metformin caused explosive stools several times a day so she has stopped it.      She has a CPAP machine - she wears 5-7 a night. She wears it every night.  She is not tossing as turning as much. She does not have the restless leg at night.  She feels more rested with wearing the CPAP machine.      Hemoglobin has come down a little - 15.1.          She has been taking amlodipine 10 mg daily in the evening.  No leg swelling, chest pain, shortness of breath.   She started on BP medication in June 2019. She does not check her BP regularly at home.        She continues to be tired.  She will take diazepam 5 mg at bedtime if she knows she cannot sleep (very infrequent).      She is back on Lexapro 5 mg daily which is helping with stressors in life.  Running household, 2 children (one with type 1 diabetes, one with developmental disability), 2 jobs.       No periods in 6 months then had a period for 4-5 days in early September 2021.  Next period 7/20/22 - lasted 4-5 days (normal period).  No hot fashes or night sweats.      She had a colonoscopy at [facility] years ago with robyn Hall's group - Alex shelton.  due to hemorrhoid issues.      REVIEW OF SYSTEMS: She denies fevers, chills, night sweats, visual change, hearing loss, sinus congestion, sore throat, chest pain, shortness of breath, nausea, vomiting, constipation, diarrhea, dysuria, hematuria, polydipsia, polyuria, joint pain, muscle pain, headaches,               Past Medical History:   Diagnosis Date    Abnormal Pap smear of cervix 03/2019     colpo benign    Gestational hypertension      HTN (hypertension) 2019                Past Surgical History:   Procedure Laterality Date    acl  replacement Right       x 2    ANTERIOR CRUCIATE LIGAMENT REPAIR Right       SECTION         2    COLPOSCOPY   2019     Stephani Noriega MD    DILATION AND CURETTAGE OF UTERUS        UTERINE FIBROID EMBOLIZATION          Social History                Socioeconomic History    Marital status:        Spouse name: Not on file    Number of children: Not on file    Years of education: Not on file    Highest education level: Not on file   Occupational History    Not on file   Social Needs    Financial resource strain: Hard    Food insecurity:       Worry: Sometimes true       Inability: Sometimes true    Transportation needs:       Medical: No       Non-medical: No   Tobacco Use    Smoking status: Former Smoker       Packs/day: 1.00       Years: 19.00       Pack years: 19.00       Last attempt to quit: 2006       Years since quittin.1    Smokeless tobacco: Never Used   Substance and Sexual Activity    Alcohol use: Yes       Alcohol/week: 4.0 standard drinks       Types: 4 Glasses of wine per week       Comment: one drink 5 times a week    Drug use: No    Sexual activity: Yes       Partners: Male       Comment: , 2 kids   Lifestyle    Physical activity:       Days per week: 2 days       Minutes per session: 40 min    Stress: Very much   Relationships    Social connections:       Talks on phone: Three times a week       Gets together: Once a week       Attends Mormon service: Not on file       Active member of club or organization: Yes       Attends meetings of clubs or organizations: More than 4 times per year       Relationship status:    Other Topics Concern    Not on file   Social History Narrative     Lives with  and children. Son with DM1, daughter with chromasomal disorder.     Working with  on increasing exercise     Generally very healthy diet, low-carb     Adopted                     Family Status   Relation Name Status    Mother  "Rosemary Aguilar Alive    Father Curtis Trotter Alive    Brother half sib Alive    Son Vikas Alive    Sister half sib Alive    MGM Eden Aguilar (Not Specified)    PGM Corine Trotter (Not Specified)    PGF Bubba Trotter (Not Specified)    Daughter Amanda Alive    Pat Uncle Alejandro Trotter (Not Specified)            Meds and allergies: updated on EPIC          /80 (BP Location: Right arm, Patient Position: Sitting)   Pulse 72   Ht 5' 2" (1.575 m)   Wt 88.9 kg (195 lb 15.8 oz)   SpO2 100%   BMI 35.85 kg/m²     General: alert, oriented x 3, no apparent distress.  Affect normal  HEENT: Conjunctivae: anicteric, PERRL, EOMI, TM clear, Oralpharynx clear  Neck: supple, no thyroid enlargement, no cervical lymphadenopathy  Resp: effort normal, lungs clear bilaterally  CV: Regular rate and rhythm without murmurs, gallops or rubs, no lower extremity edema,            Labs -9/16/22     Assessment/Plan:    Annual exam - discussed diet, exercise and safety issues.      1. Hypertension - controlled  2. Diabetes -  change trulicity to Ozempic 1 mg weekly with a coupon due to cost.   3. Mood disorder-   on lexapro  4. Insomnia and restless legs- better  5. Obesity - discussed diet and exercise.   6. Vitamin D deficiency - vitamin D 2000 units daily    7. Erythrocytosis - on cpap machine - repeat in 3 months - if no improvement on cpap, then see heme onc    8. Sleep apnea - on cpap     MMG  9/22/2021 - at diagnostic imaging through Dr Noriega    Saw Dr Noriega 8/26/22    Eye exam - Nicholas Benton on [address]     Discussed colonoscopy - will do at through [facility].      I will see her back in 6 months, Sooner if problems arise       "

## 2022-09-26 ENCOUNTER — TELEPHONE (OUTPATIENT)
Dept: PHARMACY | Facility: CLINIC | Age: 52
End: 2022-09-26
Payer: COMMERCIAL

## 2022-09-28 DIAGNOSIS — Z12.31 OTHER SCREENING MAMMOGRAM: ICD-10-CM

## 2022-10-04 ENCOUNTER — PATIENT MESSAGE (OUTPATIENT)
Dept: ADMINISTRATIVE | Facility: HOSPITAL | Age: 52
End: 2022-10-04
Payer: COMMERCIAL

## 2022-10-06 ENCOUNTER — PATIENT MESSAGE (OUTPATIENT)
Dept: RESEARCH | Facility: HOSPITAL | Age: 52
End: 2022-10-06
Payer: COMMERCIAL

## 2022-10-06 DIAGNOSIS — Z12.31 OTHER SCREENING MAMMOGRAM: ICD-10-CM

## 2022-10-10 ENCOUNTER — PATIENT MESSAGE (OUTPATIENT)
Dept: ADMINISTRATIVE | Facility: HOSPITAL | Age: 52
End: 2022-10-10
Payer: COMMERCIAL

## 2022-12-15 DIAGNOSIS — E11.9 TYPE 2 DIABETES MELLITUS WITHOUT COMPLICATION: ICD-10-CM

## 2023-01-03 ENCOUNTER — PATIENT MESSAGE (OUTPATIENT)
Dept: ADMINISTRATIVE | Facility: HOSPITAL | Age: 53
End: 2023-01-03
Payer: COMMERCIAL

## 2023-01-08 ENCOUNTER — PATIENT MESSAGE (OUTPATIENT)
Dept: INTERNAL MEDICINE | Facility: CLINIC | Age: 53
End: 2023-01-08
Payer: COMMERCIAL

## 2023-01-09 RX ORDER — DULAGLUTIDE 1.5 MG/.5ML
1.5 INJECTION, SOLUTION SUBCUTANEOUS
Qty: 4 PEN | Refills: 11 | Status: SHIPPED | OUTPATIENT
Start: 2023-01-09 | End: 2023-07-17

## 2023-01-11 ENCOUNTER — TELEPHONE (OUTPATIENT)
Dept: PHARMACY | Facility: CLINIC | Age: 53
End: 2023-01-11
Payer: COMMERCIAL

## 2023-03-17 ENCOUNTER — PATIENT MESSAGE (OUTPATIENT)
Dept: INTERNAL MEDICINE | Facility: CLINIC | Age: 53
End: 2023-03-17
Payer: COMMERCIAL

## 2023-03-17 DIAGNOSIS — D58.2 ELEVATED HEMOGLOBIN: Primary | ICD-10-CM

## 2023-03-17 DIAGNOSIS — E13.9 DIABETES MELLITUS DUE TO ABNORMAL INSULIN: ICD-10-CM

## 2023-03-21 LAB
ALBUMIN: 4.8 G/DL (ref 3.5–5)
ALP SERPL-CCNC: 89 U/L (ref 38–126)
ALT SERPL W P-5'-P-CCNC: 22 U/L (ref 7–56)
ANION GAP SERPL CALC-SCNC: 15.1 MMOL/L (ref 9–18)
AST SERPL-CCNC: 18 U/L (ref 7–40)
BASOPHILS ABSOLUTE COUNT: 0 THOUSAND/UL (ref 0–0.2)
BASOPHILS NFR BLD: 0.3 % (ref 0–2)
BILIRUB SERPL-MCNC: 0.9 MG/DL (ref 0–1.2)
BUN BLD-MCNC: 8 MG/DL (ref 7–21)
BUN/CREAT SERPL: 11 (ref 6–22)
CALC OSMOLALITY: 286 MOSM/KG (ref 275–295)
CALCIUM SERPL-MCNC: 9.3 MG/DL (ref 8.5–10.4)
CHLORIDE SERPL-SCNC: 103 MMOL/L (ref 98–107)
CHOL/HDLC RATIO: 3.09
CHOLEST SERPL-MSCNC: 170 MG/DL (ref 100–200)
CO2 SERPL-SCNC: 28 MMOL/L (ref 21–31)
CREAT SERPL-MCNC: 0.7 MG/DL (ref 0.5–1)
EGFR: 103 ML/MIN
EOSINOPHIL NFR BLD: 1.9 % (ref 0–4)
EOSINOPHILS ABSOLUTE COUNT: 0.2 THOUSAND/UL (ref 0–0.7)
ERYTHROCYTE [DISTWIDTH] IN BLOOD BY AUTOMATED COUNT: 12.9 % (ref 12–15.3)
GLUCOSE SERPL-MCNC: 174 MG/DL (ref 70–100)
HBA1C MFR BLD: 8 % (ref 4.5–5.7)
HCT VFR BLD AUTO: 47.9 % (ref 37–47)
HDLC SERPL-MCNC: 55 MG/DL (ref 40–75)
HGB BLD-MCNC: 16.3 GM/DL (ref 12–16)
LDLC SERPL CALC-MCNC: 95 MG/DL (ref 0–125)
LYMPHOCYTES %: 25.3 % (ref 15–45)
LYMPHOCYTES ABSOLUTE COUNT: 2.1 THOUSAND/UL (ref 1–4.2)
MCH RBC QN AUTO: 29.5 PG (ref 27–33)
MCHC RBC AUTO-ENTMCNC: 34.1 G/DL (ref 32–36)
MCV RBC AUTO: 86.5 FL (ref 81–99)
MONOCYTES %: 8.4 % (ref 3–13)
MONOCYTES ABSOLUTE COUNT: 0.7 THOUSAND/UL (ref 0.1–0.8)
NEUTROPHILS ABSOLUTE COUNT: 5.3 THOUSAND/UL (ref 2.1–7.6)
NEUTROPHILS RELATIVE PERCENT: 64.1 % (ref 32–80)
PLATELET # BLD AUTO: 219 THOUSAND/UL (ref 150–350)
PMV BLD AUTO: 9.9 FL (ref 7–10.2)
POTASSIUM SERPL-SCNC: 4.1 MMOL/L (ref 3.5–5)
RBC # BLD AUTO: 5.53 MILLION/UL (ref 4.2–5.4)
SODIUM BLD-SCNC: 142 MMOL/L (ref 135–145)
TOTAL PROTEIN: 6.8 G/DL (ref 6.3–8.2)
TRIGL SERPL-MCNC: 125 MG/DL (ref 30–150)
WBC # BLD AUTO: 8.3 THOUSAND/UL (ref 4.5–11)

## 2023-03-22 ENCOUNTER — OFFICE VISIT (OUTPATIENT)
Dept: INTERNAL MEDICINE | Facility: CLINIC | Age: 53
End: 2023-03-22
Payer: COMMERCIAL

## 2023-03-22 VITALS
HEIGHT: 62 IN | WEIGHT: 198.31 LBS | SYSTOLIC BLOOD PRESSURE: 122 MMHG | DIASTOLIC BLOOD PRESSURE: 70 MMHG | OXYGEN SATURATION: 98 % | HEART RATE: 74 BPM | BODY MASS INDEX: 36.49 KG/M2

## 2023-03-22 DIAGNOSIS — D58.2 ELEVATED HEMOGLOBIN: Primary | ICD-10-CM

## 2023-03-22 DIAGNOSIS — E13.9 DIABETES MELLITUS DUE TO ABNORMAL INSULIN: ICD-10-CM

## 2023-03-22 DIAGNOSIS — G47.33 OSA (OBSTRUCTIVE SLEEP APNEA): ICD-10-CM

## 2023-03-22 DIAGNOSIS — I10 ESSENTIAL HYPERTENSION: ICD-10-CM

## 2023-03-22 PROCEDURE — 99999 PR PBB SHADOW E&M-EST. PATIENT-LVL III: ICD-10-PCS | Mod: PBBFAC,,, | Performed by: INTERNAL MEDICINE

## 2023-03-22 PROCEDURE — 3052F HG A1C>EQUAL 8.0%<EQUAL 9.0%: CPT | Mod: CPTII,S$GLB,, | Performed by: INTERNAL MEDICINE

## 2023-03-22 PROCEDURE — 3008F PR BODY MASS INDEX (BMI) DOCUMENTED: ICD-10-PCS | Mod: CPTII,S$GLB,, | Performed by: INTERNAL MEDICINE

## 2023-03-22 PROCEDURE — 99999 PR PBB SHADOW E&M-EST. PATIENT-LVL III: CPT | Mod: PBBFAC,,, | Performed by: INTERNAL MEDICINE

## 2023-03-22 PROCEDURE — 1159F PR MEDICATION LIST DOCUMENTED IN MEDICAL RECORD: ICD-10-PCS | Mod: CPTII,S$GLB,, | Performed by: INTERNAL MEDICINE

## 2023-03-22 PROCEDURE — 3074F SYST BP LT 130 MM HG: CPT | Mod: CPTII,S$GLB,, | Performed by: INTERNAL MEDICINE

## 2023-03-22 PROCEDURE — 99214 OFFICE O/P EST MOD 30 MIN: CPT | Mod: S$GLB,,, | Performed by: INTERNAL MEDICINE

## 2023-03-22 PROCEDURE — 3008F BODY MASS INDEX DOCD: CPT | Mod: CPTII,S$GLB,, | Performed by: INTERNAL MEDICINE

## 2023-03-22 PROCEDURE — 3052F PR MOST RECENT HEMOGLOBIN A1C LEVEL 8.0 - < 9.0%: ICD-10-PCS | Mod: CPTII,S$GLB,, | Performed by: INTERNAL MEDICINE

## 2023-03-22 PROCEDURE — 1159F MED LIST DOCD IN RCRD: CPT | Mod: CPTII,S$GLB,, | Performed by: INTERNAL MEDICINE

## 2023-03-22 PROCEDURE — 99214 PR OFFICE/OUTPT VISIT, EST, LEVL IV, 30-39 MIN: ICD-10-PCS | Mod: S$GLB,,, | Performed by: INTERNAL MEDICINE

## 2023-03-22 PROCEDURE — 3074F PR MOST RECENT SYSTOLIC BLOOD PRESSURE < 130 MM HG: ICD-10-PCS | Mod: CPTII,S$GLB,, | Performed by: INTERNAL MEDICINE

## 2023-03-22 PROCEDURE — 3078F PR MOST RECENT DIASTOLIC BLOOD PRESSURE < 80 MM HG: ICD-10-PCS | Mod: CPTII,S$GLB,, | Performed by: INTERNAL MEDICINE

## 2023-03-22 PROCEDURE — 3078F DIAST BP <80 MM HG: CPT | Mod: CPTII,S$GLB,, | Performed by: INTERNAL MEDICINE

## 2023-03-22 RX ORDER — DULAGLUTIDE 3 MG/.5ML
3 INJECTION, SOLUTION SUBCUTANEOUS
Qty: 4 PEN | Refills: 11 | Status: SHIPPED | OUTPATIENT
Start: 2023-03-22 | End: 2023-07-17

## 2023-03-22 NOTE — PROGRESS NOTES
Chief Complaint:  Follow up of blood pressure, blood sugar, mood disorder     HPI:This is a 52 year old woman who presents for follow up of above.      She is taking Trulicity 1.5 mg SQ every 2 weeks due to insurance cost- Costs $200 per pen.  She was on ozempic every 2 weeks which did not work as well.  Tried a metformin at night and had cramping the next day.     She has a CPAP machine - she wears 5-6 a night. She wears the CPAP machine all night (1-2 nights she takes it off after 3 hours) She is not tossing as turning as much. She does not have the restless leg at night.        Hemoglobin is higher at 16.3.           She has been taking amlodipine 10 mg daily in the evening.  No leg swelling, chest pain, shortness of breath.         She continues to be tired.  She will take diazepam 5 mg at bedtime if she knows she cannot sleep (very infrequent).      She is back on Lexapro 5 mg daily which is helping with stressors in life.  Running household, 2 children (one with type 1 diabetes, one with developmental disability), 2 jobs.       No periods in 6 months then had a period for 4-5 days in early September 2021.  one period 7/20/22 - lasted 4-5 days (normal period).  Had a period December 29., 2022 - lasted 3 days.  Period November 2022 that lasted 3 days. No hot fashes or night sweats.      She had a colonoscopy at [facility] 5 years ago with robyn Hall's group - Alex shelton.  due to hemorrhoid issues.      REVIEW OF SYSTEMS: She denies fevers, chills, night sweats, visual change, hearing loss, sinus congestion, sore throat, chest pain, shortness of breath, nausea, vomiting, constipation, diarrhea, dysuria, hematuria, polydipsia, polyuria, joint pain, muscle pain, headaches,               Past Medical History:   Diagnosis Date    Abnormal Pap smear of cervix 03/2019     colpo benign    Gestational hypertension      HTN (hypertension) 2019                Past Surgical History:   Procedure Laterality  Date    acl replacement Right       x 2    ANTERIOR CRUCIATE LIGAMENT REPAIR Right       SECTION         2    COLPOSCOPY   2019     Stephani Noriega MD    DILATION AND CURETTAGE OF UTERUS        UTERINE FIBROID EMBOLIZATION          Social History                Socioeconomic History    Marital status:        Spouse name: Not on file    Number of children: Not on file    Years of education: Not on file    Highest education level: Not on file   Occupational History    Not on file   Social Needs    Financial resource strain: Hard    Food insecurity:       Worry: Sometimes true       Inability: Sometimes true    Transportation needs:       Medical: No       Non-medical: No   Tobacco Use    Smoking status: Former Smoker       Packs/day: 1.00       Years: 19.00       Pack years: 19.00       Last attempt to quit: 2006       Years since quittin.1    Smokeless tobacco: Never Used   Substance and Sexual Activity    Alcohol use: Yes       Alcohol/week: 4.0 standard drinks       Types: 4 Glasses of wine per week       Comment: one drink 5 times a week    Drug use: No    Sexual activity: Yes       Partners: Male       Comment: , 2 kids   Lifestyle    Physical activity:       Days per week: 2 days       Minutes per session: 40 min    Stress: Very much   Relationships    Social connections:       Talks on phone: Three times a week       Gets together: Once a week       Attends Taoism service: Not on file       Active member of club or organization: Yes       Attends meetings of clubs or organizations: More than 4 times per year       Relationship status:    Other Topics Concern    Not on file   Social History Narrative     Lives with  and children. Son with DM1, daughter with chromasomal disorder.     Working with  on increasing exercise     Generally very healthy diet, low-carb     Adopted                     Family Status   Relation Name Status     Mother Rosemary Aguilar Alive    Father Curtis Trotter Alive    Brother half sib Alive    Son Vikas Alive    Sister half sib Alive    MGM Eden Aguilar (Not Specified)    PGM Corine Trotter (Not Specified)    PGF Bubba Trotter (Not Specified)    Daughter Amanda Alive    Pat Uncle Alejandro Trotter (Not Specified)            Meds and allergies: updated on EPIC          General: alert, oriented x 3, no apparent distress.  Affect normal  HEENT: Conjunctivae: anicteric, PERRL, EOMI, TM clear, Oralpharynx clear  Neck: supple, no thyroid enlargement, no cervical lymphadenopathy  Resp: effort normal, lungs clear bilaterally  CV: Regular rate and rhythm without murmurs, gallops or rubs, no lower extremity edema,            Labs -3/21/23     Assessment/Plan:       1. Hypertension - controlled  2. Diabetes -  increase Trulicity to 3 mg once weekly.   3. Mood disorder-   on lexapro  4. Insomnia and restless legs- better  5. Obesity - discussed diet and exercise.   6. Vitamin D deficiency - vitamin D 2000 units daily    7. Erythrocytosis -work on wearing cpap machine more consistently.  Push fluids - repeat in 2-3 months - if no improvement on cpap, then see heme onc    8. Sleep apnea - on cpap     MMG  11/9/22- at diagnostic imaging through Dr Noriega     Saw Dr Noriega 8/26/22     Eye exam - Nicholas Benton on [address]     colonoscopy 3/6/23 - normal - given 10 years. Alex Chang.      I will see her back in 6 months, Sooner if problems arise

## 2023-04-03 ENCOUNTER — PATIENT MESSAGE (OUTPATIENT)
Dept: ADMINISTRATIVE | Facility: HOSPITAL | Age: 53
End: 2023-04-03
Payer: COMMERCIAL

## 2023-04-05 ENCOUNTER — PATIENT OUTREACH (OUTPATIENT)
Dept: ADMINISTRATIVE | Facility: HOSPITAL | Age: 53
End: 2023-04-05
Payer: COMMERCIAL

## 2023-04-05 DIAGNOSIS — E13.9 DIABETES MELLITUS DUE TO ABNORMAL INSULIN: Primary | ICD-10-CM

## 2023-04-05 NOTE — PROGRESS NOTES
Health Maintenance Due   Topic Date Due    Hepatitis C Screening  Never done    Pneumococcal Vaccines (Age 0-64) (1 - PCV) Never done    TETANUS VACCINE  Never done    Colorectal Cancer Screening  Never done    Shingles Vaccine (1 of 2) Never done    Diabetes Urine Screening  09/25/2020    Foot Exam  04/29/2023        updated. Triggered LINKS and Care Everywhere. Ordered urine micro albumin lab . Outreached patient for scheduling.    Melisa Biswas LPN   Clinical Care Coordinator  Primary Care and Wellness

## 2023-04-21 ENCOUNTER — PATIENT MESSAGE (OUTPATIENT)
Dept: ADMINISTRATIVE | Facility: HOSPITAL | Age: 53
End: 2023-04-21
Payer: COMMERCIAL

## 2023-05-23 DIAGNOSIS — I10 ESSENTIAL HYPERTENSION: ICD-10-CM

## 2023-05-24 RX ORDER — AMLODIPINE BESYLATE 10 MG/1
TABLET ORAL
Qty: 90 TABLET | Refills: 3 | Status: SHIPPED | OUTPATIENT
Start: 2023-05-24

## 2023-05-24 NOTE — TELEPHONE ENCOUNTER
Refill Decision Note   Gege Valenzuela  is requesting a refill authorization.  Brief Assessment and Rationale for Refill:  Approve     Medication Therapy Plan:       Medication Reconciliation Completed: No   Comments:     No Care Gaps recommended.     Note composed:6:36 AM 05/24/2023

## 2023-05-24 NOTE — TELEPHONE ENCOUNTER
No care due was identified.  [facility] Embedded Care Due Messages. Reference number: [id number].   5/23/2023 9:04:22 PM CDT

## 2023-05-26 LAB
BASOPHILS # BLD AUTO: 29 CELLS/UL (ref 0–200)
BASOPHILS NFR BLD AUTO: 0.4 %
EOSINOPHIL # BLD AUTO: 183 CELLS/UL (ref 15–500)
EOSINOPHIL NFR BLD AUTO: 2.5 %
ERYTHROCYTE [DISTWIDTH] IN BLOOD BY AUTOMATED COUNT: 12.8 % (ref 11–15)
HCT VFR BLD AUTO: 48.6 % (ref 35–45)
HGB BLD-MCNC: 16.3 G/DL (ref 11.7–15.5)
LYMPHOCYTES # BLD AUTO: 2102 CELLS/UL (ref 850–3900)
LYMPHOCYTES NFR BLD AUTO: 28.8 %
MCH RBC QN AUTO: 29.7 PG (ref 27–33)
MCHC RBC AUTO-ENTMCNC: 33.5 G/DL (ref 32–36)
MCV RBC AUTO: 88.7 FL (ref 80–100)
MONOCYTES # BLD AUTO: 540 CELLS/UL (ref 200–950)
MONOCYTES NFR BLD AUTO: 7.4 %
NEUTROPHILS # BLD AUTO: 4446 CELLS/UL (ref 1500–7800)
NEUTROPHILS NFR BLD AUTO: 60.9 %
PLATELET # BLD AUTO: 221 THOUSAND/UL (ref 140–400)
PMV BLD REES-ECKER: 12 FL (ref 7.5–12.5)
RBC # BLD AUTO: 5.48 MILLION/UL (ref 3.8–5.1)
WBC # BLD AUTO: 7.3 THOUSAND/UL (ref 3.8–10.8)

## 2023-07-17 ENCOUNTER — PATIENT MESSAGE (OUTPATIENT)
Dept: INTERNAL MEDICINE | Facility: CLINIC | Age: 53
End: 2023-07-17
Payer: COMMERCIAL

## 2023-07-18 ENCOUNTER — PATIENT MESSAGE (OUTPATIENT)
Dept: INTERNAL MEDICINE | Facility: CLINIC | Age: 53
End: 2023-07-18
Payer: COMMERCIAL

## 2023-07-19 RX ORDER — PIOGLITAZONEHYDROCHLORIDE 15 MG/1
15 TABLET ORAL DAILY
Qty: 30 TABLET | Refills: 2 | Status: SHIPPED | OUTPATIENT
Start: 2023-07-19 | End: 2023-09-14 | Stop reason: SDUPTHER

## 2023-09-07 ENCOUNTER — PATIENT MESSAGE (OUTPATIENT)
Dept: INTERNAL MEDICINE | Facility: CLINIC | Age: 53
End: 2023-09-07
Payer: COMMERCIAL

## 2023-09-07 DIAGNOSIS — E13.9 DIABETES MELLITUS DUE TO ABNORMAL INSULIN: Primary | ICD-10-CM

## 2023-09-14 ENCOUNTER — OFFICE VISIT (OUTPATIENT)
Dept: INTERNAL MEDICINE | Facility: CLINIC | Age: 53
End: 2023-09-14
Payer: COMMERCIAL

## 2023-09-14 VITALS
OXYGEN SATURATION: 96 % | BODY MASS INDEX: 36.07 KG/M2 | HEIGHT: 62 IN | DIASTOLIC BLOOD PRESSURE: 78 MMHG | WEIGHT: 196 LBS | SYSTOLIC BLOOD PRESSURE: 120 MMHG | HEART RATE: 80 BPM

## 2023-09-14 DIAGNOSIS — F43.0 STRESS REACTION: ICD-10-CM

## 2023-09-14 LAB
ALBUMIN SERPL-MCNC: 4.5 G/DL (ref 3.6–5.1)
ALBUMIN/GLOB SERPL: 1.8 (CALC) (ref 1–2.5)
ALP SERPL-CCNC: 87 U/L (ref 37–153)
ALT SERPL-CCNC: 14 U/L (ref 6–29)
AST SERPL-CCNC: 16 U/L (ref 10–35)
BASOPHILS # BLD AUTO: 22 CELLS/UL (ref 0–200)
BASOPHILS NFR BLD AUTO: 0.3 %
BILIRUB SERPL-MCNC: 1.1 MG/DL (ref 0.2–1.2)
BUN SERPL-MCNC: 9 MG/DL (ref 7–25)
BUN/CREAT SERPL: ABNORMAL (CALC) (ref 6–22)
CALCIUM SERPL-MCNC: 8.9 MG/DL (ref 8.6–10.4)
CHLORIDE SERPL-SCNC: 102 MMOL/L (ref 98–110)
CO2 SERPL-SCNC: 27 MMOL/L (ref 20–32)
CREAT SERPL-MCNC: 0.57 MG/DL (ref 0.5–1.03)
EGFR: 108 ML/MIN/1.73M2
EOSINOPHIL # BLD AUTO: 72 CELLS/UL (ref 15–500)
EOSINOPHIL NFR BLD AUTO: 1 %
ERYTHROCYTE [DISTWIDTH] IN BLOOD BY AUTOMATED COUNT: 12.6 % (ref 11–15)
GLOBULIN SER CALC-MCNC: 2.5 G/DL (CALC) (ref 1.9–3.7)
GLUCOSE SERPL-MCNC: 146 MG/DL (ref 65–139)
HBA1C MFR BLD: 8.8 % OF TOTAL HGB
HCT VFR BLD AUTO: 49.4 % (ref 35–45)
HGB BLD-MCNC: 16.8 G/DL (ref 11.7–15.5)
LYMPHOCYTES # BLD AUTO: 1606 CELLS/UL (ref 850–3900)
LYMPHOCYTES NFR BLD AUTO: 22.3 %
MCH RBC QN AUTO: 29.6 PG (ref 27–33)
MCHC RBC AUTO-ENTMCNC: 34 G/DL (ref 32–36)
MCV RBC AUTO: 87.1 FL (ref 80–100)
MONOCYTES # BLD AUTO: 1130 CELLS/UL (ref 200–950)
MONOCYTES NFR BLD AUTO: 15.7 %
NEUTROPHILS # BLD AUTO: 4370 CELLS/UL (ref 1500–7800)
NEUTROPHILS NFR BLD AUTO: 60.7 %
PLATELET # BLD AUTO: 195 THOUSAND/UL (ref 140–400)
PMV BLD REES-ECKER: 12 FL (ref 7.5–12.5)
POTASSIUM SERPL-SCNC: 3.8 MMOL/L (ref 3.5–5.3)
PROT SERPL-MCNC: 7 G/DL (ref 6.1–8.1)
RBC # BLD AUTO: 5.67 MILLION/UL (ref 3.8–5.1)
SODIUM SERPL-SCNC: 140 MMOL/L (ref 135–146)
TSH SERPL-ACNC: 1.74 MIU/L
WBC # BLD AUTO: 7.2 THOUSAND/UL (ref 3.8–10.8)

## 2023-09-14 PROCEDURE — 3052F HG A1C>EQUAL 8.0%<EQUAL 9.0%: CPT | Mod: CPTII,S$GLB,, | Performed by: INTERNAL MEDICINE

## 2023-09-14 PROCEDURE — 99396 PR PREVENTIVE VISIT,EST,40-64: ICD-10-PCS | Mod: S$GLB,,, | Performed by: INTERNAL MEDICINE

## 2023-09-14 PROCEDURE — 3008F PR BODY MASS INDEX (BMI) DOCUMENTED: ICD-10-PCS | Mod: CPTII,S$GLB,, | Performed by: INTERNAL MEDICINE

## 2023-09-14 PROCEDURE — 3074F SYST BP LT 130 MM HG: CPT | Mod: CPTII,S$GLB,, | Performed by: INTERNAL MEDICINE

## 2023-09-14 PROCEDURE — 3074F PR MOST RECENT SYSTOLIC BLOOD PRESSURE < 130 MM HG: ICD-10-PCS | Mod: CPTII,S$GLB,, | Performed by: INTERNAL MEDICINE

## 2023-09-14 PROCEDURE — 3078F PR MOST RECENT DIASTOLIC BLOOD PRESSURE < 80 MM HG: ICD-10-PCS | Mod: CPTII,S$GLB,, | Performed by: INTERNAL MEDICINE

## 2023-09-14 PROCEDURE — 99999 PR PBB SHADOW E&M-EST. PATIENT-LVL III: CPT | Mod: PBBFAC,,, | Performed by: INTERNAL MEDICINE

## 2023-09-14 PROCEDURE — 3052F PR MOST RECENT HEMOGLOBIN A1C LEVEL 8.0 - < 9.0%: ICD-10-PCS | Mod: CPTII,S$GLB,, | Performed by: INTERNAL MEDICINE

## 2023-09-14 PROCEDURE — 99999 PR PBB SHADOW E&M-EST. PATIENT-LVL III: ICD-10-PCS | Mod: PBBFAC,,, | Performed by: INTERNAL MEDICINE

## 2023-09-14 PROCEDURE — 3078F DIAST BP <80 MM HG: CPT | Mod: CPTII,S$GLB,, | Performed by: INTERNAL MEDICINE

## 2023-09-14 PROCEDURE — 3008F BODY MASS INDEX DOCD: CPT | Mod: CPTII,S$GLB,, | Performed by: INTERNAL MEDICINE

## 2023-09-14 PROCEDURE — 99396 PREV VISIT EST AGE 40-64: CPT | Mod: S$GLB,,, | Performed by: INTERNAL MEDICINE

## 2023-09-14 RX ORDER — ESCITALOPRAM OXALATE 5 MG/1
5 TABLET ORAL DAILY
Qty: 90 TABLET | Refills: 4 | Status: SHIPPED | OUTPATIENT
Start: 2023-09-14 | End: 2023-11-16

## 2023-09-14 RX ORDER — PIOGLITAZONEHYDROCHLORIDE 30 MG/1
30 TABLET ORAL DAILY
Qty: 90 TABLET | Refills: 1 | Status: SHIPPED | OUTPATIENT
Start: 2023-09-14 | End: 2024-01-10 | Stop reason: SDUPTHER

## 2023-09-14 NOTE — PROGRESS NOTES
Chief Complaint: Annual exam  Follow up of blood pressure, blood sugar, mood disorder     HPI:This is a 52 year old woman who presents for follow up of above.     She stated to have scratchy throat and sneezing 2 days ago. She got fever and body aches 3-4/10.  Had a sore throat over night - better today with water.   Tested positive for COVID this morning.  Some mild drainage.  Slight cough.  She has take benadryl last night to sleep.   No meds this morning.     She is now taking pioglitazone 15 mg daily for her diabetes since 7/19/23. Morning blood sugars - little better .  No leg swelling  She has been exercising with [email or web address] once a week.  She feels that her abdominal girth is smaller.      She has a CPAP machine - she wears 5-6 a night. She wears the CPAP machine 4-6 hours at night. She is not tossing as turning as much. She does not have the restless leg at night.        Hemoglobin is16.8          She has been taking amlodipine 10 mg daily in the evening.  No leg swelling, chest pain, shortness of breath.         She continues to be tired.  She will take diazepam 5 mg at bedtime if she knows she cannot sleep (rare).      She is back on Lexapro 5 mg daily which is helping with stressors in life.  Running household, 2 children (one with type 1 diabetes, one with developmental disability), 2 jobs.       No periods in 6 months then had a period for 4-5 days in early September 2021.  one period 7/20/22 - lasted 4-5 days (normal period).  Had a period December 29., 2022 - lasted 3 days.  Period November 2022 that lasted 3 days. No hot fashes or night sweats.  She runs hotter.      She had a colonoscopy at [facility] 5 years ago with robyn Hall's group - Alex shelton.  due to hemorrhoid issues.      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     Daughter is at [email or web address]          Past Medical History:   Diagnosis Date    Abnormal Pap smear of cervix 2019     colpo benign    Gestational hypertension      HTN (hypertension)                 Past Surgical History:   Procedure Laterality Date    acl replacement Right       x 2    ANTERIOR CRUCIATE LIGAMENT REPAIR Right       SECTION         2    COLPOSCOPY   2019     Stephani Noriega MD    DILATION AND CURETTAGE OF UTERUS        UTERINE FIBROID EMBOLIZATION          Social History                Socioeconomic History    Marital status:        Spouse name: Not on file    Number of children: Not on file    Years of education: Not on file    Highest education level: Not on file   Occupational History    Not on file   Social Needs    Financial resource strain: Hard    Food insecurity:       Worry: Sometimes true       Inability: Sometimes true    Transportation needs:       Medical: No       Non-medical: No   Tobacco Use    Smoking status: Former Smoker       Packs/day: 1.00       Years: 19.00       Pack years: 19.00       Last attempt to quit: 2006       Years since quittin.1    Smokeless tobacco: Never Used   Substance and Sexual Activity    Alcohol use: Yes       Alcohol/week: 4.0 standard drinks       Types: 4 Glasses of wine per week       Comment: one drink 5 times a week    Drug use: No    Sexual activity: Yes       Partners: Male       Comment: , 2 kids   Lifestyle    Physical activity:       Days per week: 2 days       Minutes per session: 40 min    Stress: Very much   Relationships    Social connections:       Talks on phone: Three times a week       Gets together: Once a week       Attends Taoist service: Not on file       Active member of club or organization: Yes       Attends meetings of clubs or organizations:  "More than 4 times per year       Relationship status:    Other Topics Concern    Not on file   Social History Narrative     Lives with  and children. Son with DM1, daughter with chromasomal disorder.     Working with  on increasing exercise     Generally very healthy diet, low-carb     Adopted                     Family Status   Relation Name Status    Mother Rosemary Aguilar Alive    Father Curtis Trotter Alive    Brother half sib Alive    Son Vikas Alive    Sister half sib Alive    MGM Eden Aguilar (Not Specified)    PGM Corine Trotter (Not Specified)    PGF Bubba Trotter (Not Specified)    Daughter Amanda Alive    Pat Uncle Alejandro Trotter (Not Specified)            Meds and allergies: updated on EPIC       /78   Pulse 80   Ht 5' 2" (1.575 m)   Wt 88.9 kg (196 lb)   SpO2 96%   BMI 35.85 kg/m²     General: alert, oriented x 3, no apparent distress.  Affect normal  HEENT: Conjunctivae: anicteric, PERRL, EOMI, TM clear, Oralpharynx clear  Neck: supple, no thyroid enlargement, no cervical lymphadenopathy  Resp: effort normal, lungs clear bilaterally  CV: Regular rate and rhythm without murmurs, gallops or rubs, no lower extremity edema,            Labs      Assessment/Plan:    Annual exam - discussed diet, exercise and safety issues.         1. Hypertension - controlled  2. Diabetes -  increase pioglytizone 30 mg once daily  3. Mood disorder-   on lexapro  4. Insomnia and restless legs- better  5. Obesity - discussed diet and exercise.   6. Vitamin D deficiency - vitamin D 2000 units daily    7. Erythrocytosis -work on wearing cpap machine more consistently.   8. Sleep apnea - on cpap  9. COVID -- symptomatic treatment. Let me know if worsen     MMG  11/9/22- at diagnostic imaging through Dr Noriega     Saw Dr Noriega 8/26/22- has an apt scheduled for 9/26/23     Eye exam - Nicholas Benton on [address]     colonoscopy 3/6/23 - normal - given 10 years. Alex Chang. "      I will see her back in 3 months, Sooner if problems arise

## 2023-11-16 DIAGNOSIS — F43.0 STRESS REACTION: ICD-10-CM

## 2023-11-16 RX ORDER — ESCITALOPRAM OXALATE 5 MG/1
5 TABLET ORAL
Qty: 90 TABLET | Refills: 3 | Status: SHIPPED | OUTPATIENT
Start: 2023-11-16

## 2023-11-17 NOTE — TELEPHONE ENCOUNTER
Refill Decision Note   Gege Bianca  is requesting a refill authorization.  Brief Assessment and Rationale for Refill:  Approve     Medication Therapy Plan:         Comments:     Note composed:8:41 PM 11/16/2023

## 2024-01-07 ENCOUNTER — PATIENT MESSAGE (OUTPATIENT)
Dept: INTERNAL MEDICINE | Facility: CLINIC | Age: 54
End: 2024-01-07
Payer: COMMERCIAL

## 2024-01-07 DIAGNOSIS — E13.9 DIABETES MELLITUS DUE TO ABNORMAL INSULIN: Primary | ICD-10-CM

## 2024-01-10 ENCOUNTER — OFFICE VISIT (OUTPATIENT)
Dept: INTERNAL MEDICINE | Facility: CLINIC | Age: 54
End: 2024-01-10
Payer: COMMERCIAL

## 2024-01-10 VITALS
BODY MASS INDEX: 38.46 KG/M2 | HEART RATE: 72 BPM | HEIGHT: 62 IN | WEIGHT: 209 LBS | DIASTOLIC BLOOD PRESSURE: 78 MMHG | OXYGEN SATURATION: 98 % | SYSTOLIC BLOOD PRESSURE: 130 MMHG

## 2024-01-10 DIAGNOSIS — E13.9 DIABETES MELLITUS DUE TO ABNORMAL INSULIN: Primary | ICD-10-CM

## 2024-01-10 DIAGNOSIS — E66.01 SEVERE OBESITY (BMI 35.0-39.9) WITH COMORBIDITY: ICD-10-CM

## 2024-01-10 DIAGNOSIS — D58.2 ELEVATED HEMOGLOBIN: ICD-10-CM

## 2024-01-10 DIAGNOSIS — E11.3293 TYPE 2 DIABETES MELLITUS WITH MILD NONPROLIFERATIVE RETINOPATHY OF BOTH EYES WITHOUT MACULAR EDEMA, UNSPECIFIED WHETHER LONG TERM INSULIN USE: ICD-10-CM

## 2024-01-10 DIAGNOSIS — F39 UNSPECIFIED MOOD (AFFECTIVE) DISORDER: ICD-10-CM

## 2024-01-10 LAB
ALBUMIN SERPL-MCNC: 4.2 G/DL (ref 3.6–5.1)
ALBUMIN/GLOB SERPL: 1.7 (CALC) (ref 1–2.5)
ALP SERPL-CCNC: 82 U/L (ref 37–153)
ALT SERPL-CCNC: 16 U/L (ref 6–29)
AST SERPL-CCNC: 13 U/L (ref 10–35)
BASOPHILS # BLD AUTO: 22 CELLS/UL (ref 0–200)
BASOPHILS NFR BLD AUTO: 0.3 %
BILIRUB SERPL-MCNC: 0.8 MG/DL (ref 0.2–1.2)
BUN SERPL-MCNC: 15 MG/DL (ref 7–25)
BUN/CREAT SERPL: ABNORMAL (CALC) (ref 6–22)
CALCIUM SERPL-MCNC: 9.3 MG/DL (ref 8.6–10.4)
CHLORIDE SERPL-SCNC: 102 MMOL/L (ref 98–110)
CO2 SERPL-SCNC: 30 MMOL/L (ref 20–32)
CREAT SERPL-MCNC: 0.67 MG/DL (ref 0.5–1.03)
EGFR: 104 ML/MIN/1.73M2
EOSINOPHIL # BLD AUTO: 200 CELLS/UL (ref 15–500)
EOSINOPHIL NFR BLD AUTO: 2.7 %
ERYTHROCYTE [DISTWIDTH] IN BLOOD BY AUTOMATED COUNT: 12.7 % (ref 11–15)
GLOBULIN SER CALC-MCNC: 2.5 G/DL (CALC) (ref 1.9–3.7)
GLUCOSE SERPL-MCNC: 193 MG/DL (ref 65–139)
HBA1C MFR BLD: 8.5 % OF TOTAL HGB
HCT VFR BLD AUTO: 48 % (ref 35–45)
HGB BLD-MCNC: 16.2 G/DL (ref 11.7–15.5)
LYMPHOCYTES # BLD AUTO: 2072 CELLS/UL (ref 850–3900)
LYMPHOCYTES NFR BLD AUTO: 28 %
MCH RBC QN AUTO: 30 PG (ref 27–33)
MCHC RBC AUTO-ENTMCNC: 33.8 G/DL (ref 32–36)
MCV RBC AUTO: 88.9 FL (ref 80–100)
MONOCYTES # BLD AUTO: 518 CELLS/UL (ref 200–950)
MONOCYTES NFR BLD AUTO: 7 %
NEUTROPHILS # BLD AUTO: 4588 CELLS/UL (ref 1500–7800)
NEUTROPHILS NFR BLD AUTO: 62 %
PLATELET # BLD AUTO: 241 THOUSAND/UL (ref 140–400)
PMV BLD REES-ECKER: 11.7 FL (ref 7.5–12.5)
POTASSIUM SERPL-SCNC: 4.1 MMOL/L (ref 3.5–5.3)
PROT SERPL-MCNC: 6.7 G/DL (ref 6.1–8.1)
RBC # BLD AUTO: 5.4 MILLION/UL (ref 3.8–5.1)
SODIUM SERPL-SCNC: 141 MMOL/L (ref 135–146)
TSH SERPL-ACNC: 2.41 MIU/L
WBC # BLD AUTO: 7.4 THOUSAND/UL (ref 3.8–10.8)

## 2024-01-10 PROCEDURE — 1159F MED LIST DOCD IN RCRD: CPT | Mod: CPTII,S$GLB,, | Performed by: INTERNAL MEDICINE

## 2024-01-10 PROCEDURE — 3075F SYST BP GE 130 - 139MM HG: CPT | Mod: CPTII,S$GLB,, | Performed by: INTERNAL MEDICINE

## 2024-01-10 PROCEDURE — 3078F DIAST BP <80 MM HG: CPT | Mod: CPTII,S$GLB,, | Performed by: INTERNAL MEDICINE

## 2024-01-10 PROCEDURE — 3052F HG A1C>EQUAL 8.0%<EQUAL 9.0%: CPT | Mod: CPTII,S$GLB,, | Performed by: INTERNAL MEDICINE

## 2024-01-10 PROCEDURE — 99214 OFFICE O/P EST MOD 30 MIN: CPT | Mod: S$GLB,,, | Performed by: INTERNAL MEDICINE

## 2024-01-10 PROCEDURE — 99999 PR PBB SHADOW E&M-EST. PATIENT-LVL IV: CPT | Mod: PBBFAC,,, | Performed by: INTERNAL MEDICINE

## 2024-01-10 PROCEDURE — 3008F BODY MASS INDEX DOCD: CPT | Mod: CPTII,S$GLB,, | Performed by: INTERNAL MEDICINE

## 2024-01-10 RX ORDER — MELOXICAM 15 MG/1
15 TABLET ORAL DAILY
Qty: 30 TABLET | Refills: 1 | Status: SHIPPED | OUTPATIENT
Start: 2024-01-10

## 2024-01-10 RX ORDER — PIOGLITAZONEHYDROCHLORIDE 45 MG/1
45 TABLET ORAL DAILY
Qty: 90 TABLET | Refills: 1 | Status: SHIPPED | OUTPATIENT
Start: 2024-01-10 | End: 2025-01-09

## 2024-01-10 NOTE — PROGRESS NOTES
Chief Complaint:   Follow up of blood pressure, blood sugar, mood disorder     HPI:This is a 52 year old woman who presents for follow up of above.     She has had plantar fasciitis for the last 3 months (after a long walk in [place] 3 months ago).  She has not been able to exercise due to the foot issue.  She has been stretching and using a golf ball with minimal relief.  She has gained 13 pounds due to lack of exercise.     She has also had some medial left elbow pain - sensitive to touch and burning sensation. She carries her purse on the left arm.     She had a cough that lasted 2 weeks over the holidays - now resolved.     She is now taking pioglitazone 30 mg daily for her diabetes since 7/19/23. Morning blood sugars - little better .  No leg swelling        She has a CPAP machine - she wears 5-6 a night. She wears the CPAP machine 4-6 hours at night. She is not tossing as turning as much. She does not have the restless leg at night.        Hemoglobin is16.8          She has been taking amlodipine 10 mg daily in the evening.  No leg swelling, chest pain, shortness of breath.         She continues to be tired.  She will take diazepam 5 mg at bedtime if she knows she cannot sleep (rare).      She is back on Lexapro 5 mg daily which is helping with stressors in life.  Running household, 2 children (one with type 1 diabetes, one with developmental disability), 2 jobs.       No periods in 12 months then had a period for Had a period December 29., 2022 - spotting lasted 3 days. No hot fashes or night sweats.  She runs hotter.      She had a colonoscopy at [facility] 5 years ago with robyn Hall's group - Alex shelton.  due to hemorrhoid issues.      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      Daughter is at [employer]          Past Medical History:   Diagnosis Date    Abnormal Pap smear of cervix 2019     colpo benign    Gestational hypertension      HTN (hypertension)                 Past Surgical History:   Procedure Laterality Date    acl replacement Right       x 2    ANTERIOR CRUCIATE LIGAMENT REPAIR Right       SECTION         2    COLPOSCOPY   2019     Stephani Noriega MD    DILATION AND CURETTAGE OF UTERUS        UTERINE FIBROID EMBOLIZATION          Social History                Socioeconomic History    Marital status:        Spouse name: Not on file    Number of children: Not on file    Years of education: Not on file    Highest education level: Not on file   Occupational History    Not on file   Social Needs    Financial resource strain: Hard    Food insecurity:       Worry: Sometimes true       Inability: Sometimes true    Transportation needs:       Medical: No       Non-medical: No   Tobacco Use    Smoking status: Former Smoker       Packs/day: 1.00       Years: 19.00       Pack years: 19.00       Last attempt to quit: 2006       Years since quittin.1    Smokeless tobacco: Never Used   Substance and Sexual Activity    Alcohol use: Yes       Alcohol/week: 4.0 standard drinks       Types: 4 Glasses of wine per week       Comment: one drink 5 times a week    Drug use: No    Sexual activity: Yes       Partners: Male       Comment: , 2 kids   Lifestyle    Physical activity:       Days per week: 2 days       Minutes per session: 40 min    Stress: Very much   Relationships    Social connections:       Talks on phone: Three times a week       Gets together: Once a week       Attends Orthodoxy service: Not on file       Active member of club or organization: Yes       Attends meetings of clubs or organizations: More than 4 times per year       Relationship status:     Other Topics Concern    Not on file   Social History Narrative     Lives with  and children. Son with DM1, daughter with chromasomal disorder.     Working with  on increasing exercise     Generally very healthy diet, low-carb     Adopted                     Family Status   Relation Name Status    Mother Rosemary Aguilar Alive    Father Curtis Trotter Alive    Brother half sib Alive    Son Vikas Alive    Sister half sib Alive    MGM Eden Aguilar (Not Specified)    PGM Corine Trotter (Not Specified)    PGF Bubba Trotter (Not Specified)    Daughter Amanda Alive    Pat Uncle Alejandro Trotter (Not Specified)            Meds and allergies: updated on EPIC            General: alert, oriented x 3, no apparent distress.  Affect normal  HEENT: Conjunctivae: anicteric, PERRL, EOMI, TM clear, Oralpharynx clear  Neck: supple, no thyroid enlargement, no cervical lymphadenopathy  Resp: effort normal, lungs clear bilaterally  CV: Regular rate and rhythm without murmurs, gallops or rubs, no lower extremity edema,      Protective Sensation (w/ 10 gram monofilament):  Right: Intact  Left: Intact    Visual Inspection:  Normal -  Bilateral    Pedal Pulses:   Right: Present  Left: Present    Posterior Tibialis Pulses:   Right:Present  Left: Present    Tenderness over the left heel          Labs 1/7/23- A1C 8.5  Hemoglobin 16.2     Assessment/Plan:           1. Hypertension - controlled  2. Diabetes -  increase pioglytizone to 45 mg once daily. Metformin ER caused diarrhea and cramping.   3. Mood disorder-   on lexapro  4. Insomnia and restless legs- better  5. Obesity - discussed diet and exercise.   6. Vitamin D deficiency - vitamin D 2000 units daily    7. Erythrocytosis -work on wearing cpap machine more consistently.   8. Sleep apnea - on cpap  9. Plantar fasciitis - ice the feet and meloxicam 15 mg daily.      MMG  11/9/22- at diagnostic imaging through Dr Noriega- will be 2/2/24     Saw Dr Noriega  12/6/23-     Eye exam - Nicholas Benton on [facility] January 2023     colonoscopy 3/6/23 - normal - given 10 years. Alex Chang.      I will see her back in 4 months, Sooner if problems arise

## 2024-01-29 ENCOUNTER — PATIENT MESSAGE (OUTPATIENT)
Dept: INTERNAL MEDICINE | Facility: CLINIC | Age: 54
End: 2024-01-29
Payer: COMMERCIAL

## 2024-01-29 DIAGNOSIS — G47.33 OBSTRUCTIVE SLEEP APNEA: Primary | ICD-10-CM

## 2024-01-31 ENCOUNTER — PATIENT MESSAGE (OUTPATIENT)
Dept: INTERNAL MEDICINE | Facility: CLINIC | Age: 54
End: 2024-01-31
Payer: COMMERCIAL

## 2024-03-27 DIAGNOSIS — E11.9 TYPE 2 DIABETES MELLITUS WITHOUT COMPLICATION: ICD-10-CM

## 2024-05-07 ENCOUNTER — PATIENT OUTREACH (OUTPATIENT)
Dept: ADMINISTRATIVE | Facility: HOSPITAL | Age: 54
End: 2024-05-07
Payer: COMMERCIAL

## 2024-05-07 NOTE — PROGRESS NOTES
Health Maintenance Due   Topic Date Due    Hepatitis C Screening  Never done    Pneumococcal Vaccines (Age 0-64) (1 of 2 - PCV) Never done    TETANUS VACCINE  Never done    Low Dose Statin  Never done    Shingles Vaccine (1 of 2) Never done    Diabetes Urine Screening  09/25/2020    COVID-19 Vaccine (2 - 2023-24 season) 09/01/2023    Eye Exam  01/31/2024    Lipid Panel  03/21/2024    Hemoglobin A1c  04/09/2024      Chart reviewed. Triggered LINKS. Updated Care Everywhere. Patient is scheduled to see pcp on 05/15/24.     Julia Valerio CMA  Population Health Care Coordinator  Primary Care Team    This office note has been dictated.

## 2024-05-10 ENCOUNTER — PATIENT MESSAGE (OUTPATIENT)
Dept: INTERNAL MEDICINE | Facility: CLINIC | Age: 54
End: 2024-05-10
Payer: COMMERCIAL

## 2024-05-10 DIAGNOSIS — E11.3293 TYPE 2 DIABETES MELLITUS WITH MILD NONPROLIFERATIVE RETINOPATHY OF BOTH EYES WITHOUT MACULAR EDEMA, UNSPECIFIED WHETHER LONG TERM INSULIN USE: Primary | ICD-10-CM

## 2024-05-14 ENCOUNTER — TELEPHONE (OUTPATIENT)
Dept: INTERNAL MEDICINE | Facility: CLINIC | Age: 54
End: 2024-05-14
Payer: COMMERCIAL

## 2024-05-14 DIAGNOSIS — E13.9 DIABETES MELLITUS DUE TO ABNORMAL INSULIN: Primary | ICD-10-CM

## 2024-05-14 NOTE — TELEPHONE ENCOUNTER
Called patient to check to see if had their DM eye exam this year, states has not.   Offered the DM eyecam while here to see PCP at visit and she agrees. 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 Offered the eyecam tomorrow at visit but encouraged that she still needs to see the eye doctor.   Appt set  Symone Yun RN HC

## 2024-05-15 ENCOUNTER — OFFICE VISIT (OUTPATIENT)
Dept: INTERNAL MEDICINE | Facility: CLINIC | Age: 54
End: 2024-05-15
Payer: COMMERCIAL

## 2024-05-15 VITALS
SYSTOLIC BLOOD PRESSURE: 124 MMHG | WEIGHT: 220.44 LBS | HEIGHT: 62 IN | OXYGEN SATURATION: 96 % | HEART RATE: 71 BPM | DIASTOLIC BLOOD PRESSURE: 80 MMHG | BODY MASS INDEX: 40.57 KG/M2

## 2024-05-15 DIAGNOSIS — E11.9 TYPE 2 DIABETES MELLITUS WITHOUT COMPLICATION, WITHOUT LONG-TERM CURRENT USE OF INSULIN: ICD-10-CM

## 2024-05-15 DIAGNOSIS — I10 ESSENTIAL HYPERTENSION: ICD-10-CM

## 2024-05-15 DIAGNOSIS — G47.33 OSA (OBSTRUCTIVE SLEEP APNEA): Primary | ICD-10-CM

## 2024-05-15 DIAGNOSIS — F39 UNSPECIFIED MOOD (AFFECTIVE) DISORDER: ICD-10-CM

## 2024-05-15 DIAGNOSIS — E11.3293 TYPE 2 DIABETES MELLITUS WITH MILD NONPROLIFERATIVE RETINOPATHY OF BOTH EYES WITHOUT MACULAR EDEMA, UNSPECIFIED WHETHER LONG TERM INSULIN USE: ICD-10-CM

## 2024-05-15 DIAGNOSIS — D58.2 ELEVATED HEMOGLOBIN: ICD-10-CM

## 2024-05-15 LAB
ALBUMIN SERPL-MCNC: 4.2 G/DL (ref 3.6–5.1)
ALBUMIN/GLOB SERPL: 1.8 (CALC) (ref 1–2.5)
ALP SERPL-CCNC: 78 U/L (ref 37–153)
ALT SERPL-CCNC: 15 U/L (ref 6–29)
AST SERPL-CCNC: 12 U/L (ref 10–35)
BASOPHILS # BLD AUTO: 22 CELLS/UL (ref 0–200)
BASOPHILS NFR BLD AUTO: 0.4 %
BILIRUB SERPL-MCNC: 1 MG/DL (ref 0.2–1.2)
BUN SERPL-MCNC: 11 MG/DL (ref 7–25)
BUN/CREAT SERPL: ABNORMAL (CALC) (ref 6–22)
CALCIUM SERPL-MCNC: 8.6 MG/DL (ref 8.6–10.4)
CHLORIDE SERPL-SCNC: 101 MMOL/L (ref 98–110)
CHOLEST SERPL-MCNC: 198 MG/DL
CHOLEST/HDLC SERPL: 2.8 (CALC)
CO2 SERPL-SCNC: 28 MMOL/L (ref 20–32)
CREAT SERPL-MCNC: 0.59 MG/DL (ref 0.5–1.03)
EGFR: 107 ML/MIN/1.73M2
EOSINOPHIL # BLD AUTO: 179 CELLS/UL (ref 15–500)
EOSINOPHIL NFR BLD AUTO: 3.2 %
ERYTHROCYTE [DISTWIDTH] IN BLOOD BY AUTOMATED COUNT: 12.7 % (ref 11–15)
GLOBULIN SER CALC-MCNC: 2.3 G/DL (CALC) (ref 1.9–3.7)
GLUCOSE SERPL-MCNC: 201 MG/DL (ref 65–99)
HBA1C MFR BLD: 8.4 % OF TOTAL HGB
HCT VFR BLD AUTO: 46.6 % (ref 35–45)
HDLC SERPL-MCNC: 70 MG/DL
HGB BLD-MCNC: 15.3 G/DL (ref 11.7–15.5)
LDLC SERPL CALC-MCNC: 103 MG/DL (CALC)
LYMPHOCYTES # BLD AUTO: 1630 CELLS/UL (ref 850–3900)
LYMPHOCYTES NFR BLD AUTO: 29.1 %
MCH RBC QN AUTO: 29.7 PG (ref 27–33)
MCHC RBC AUTO-ENTMCNC: 32.8 G/DL (ref 32–36)
MCV RBC AUTO: 90.3 FL (ref 80–100)
MONOCYTES # BLD AUTO: 493 CELLS/UL (ref 200–950)
MONOCYTES NFR BLD AUTO: 8.8 %
NEUTROPHILS # BLD AUTO: 3276 CELLS/UL (ref 1500–7800)
NEUTROPHILS NFR BLD AUTO: 58.5 %
NONHDLC SERPL-MCNC: 128 MG/DL (CALC)
PLATELET # BLD AUTO: 204 THOUSAND/UL (ref 140–400)
PMV BLD REES-ECKER: 11.7 FL (ref 7.5–12.5)
POTASSIUM SERPL-SCNC: 3.7 MMOL/L (ref 3.5–5.3)
PROT SERPL-MCNC: 6.5 G/DL (ref 6.1–8.1)
RBC # BLD AUTO: 5.16 MILLION/UL (ref 3.8–5.1)
SODIUM SERPL-SCNC: 139 MMOL/L (ref 135–146)
TRIGL SERPL-MCNC: 152 MG/DL
WBC # BLD AUTO: 5.6 THOUSAND/UL (ref 3.8–10.8)

## 2024-05-15 PROCEDURE — 3074F SYST BP LT 130 MM HG: CPT | Mod: CPTII,S$GLB,, | Performed by: INTERNAL MEDICINE

## 2024-05-15 PROCEDURE — 99999 PR PBB SHADOW E&M-EST. PATIENT-LVL III: CPT | Mod: PBBFAC,,, | Performed by: INTERNAL MEDICINE

## 2024-05-15 PROCEDURE — 1159F MED LIST DOCD IN RCRD: CPT | Mod: CPTII,S$GLB,, | Performed by: INTERNAL MEDICINE

## 2024-05-15 PROCEDURE — 3052F HG A1C>EQUAL 8.0%<EQUAL 9.0%: CPT | Mod: CPTII,S$GLB,, | Performed by: INTERNAL MEDICINE

## 2024-05-15 PROCEDURE — 3008F BODY MASS INDEX DOCD: CPT | Mod: CPTII,S$GLB,, | Performed by: INTERNAL MEDICINE

## 2024-05-15 PROCEDURE — 99214 OFFICE O/P EST MOD 30 MIN: CPT | Mod: S$GLB,,, | Performed by: INTERNAL MEDICINE

## 2024-05-15 PROCEDURE — 3079F DIAST BP 80-89 MM HG: CPT | Mod: CPTII,S$GLB,, | Performed by: INTERNAL MEDICINE

## 2024-05-15 RX ORDER — METFORMIN HYDROCHLORIDE 500 MG/1
500 TABLET, EXTENDED RELEASE ORAL 2 TIMES DAILY WITH MEALS
Start: 2024-05-15 | End: 2025-05-15

## 2024-05-15 NOTE — PROGRESS NOTES
Chief Complaint:   Follow up of blood pressure, blood sugar, mood disorder     HPI:This is a 53 year old woman who presents for follow up of above.     She started to have leg swelling several weeks since our last visit when I increased pioglitazone to 45 mg daily  SHe also started to have foot pain in October (a month after pioglitazone was increased to 30 mg daily.    She is not taking pioglitazone 45 mg 1/2 tablet every other day alternating with 30 mg every other day.   She continues to have swelling in her legs, but not as bad.    SHe took a steroid pack for her plantar fasciitis which helped but is still having foot pain. .      Left elbow pain has resolved. .      She has a CPAP machine - she wears 5-6 a night. She wears the CPAP machine 4-6 hours at night. She is not tossing as turning as much. She does not have the restless leg at night.        Hemoglobin is now 15.3.      She has been taking amlodipine 10 mg daily in the evening.  No leg swelling, chest pain, shortness of breath.       She continues to be tired.  She will take diazepam 5 mg at bedtime if she knows she cannot sleep (rare).      She is back on Lexapro 5 mg daily which is helping with stressors in life.  Running household, 2 children (one with type 1 diabetes, one with developmental disability), 2 jobs.       No periods in 12 months then had a period for Had a period December 29., 2022 - spotting lasted 3 days. No hot fashes or night sweats.  She runs hotter.      She had a colonoscopy at [facility] 5 years ago with robyn Hall's group - Alex shelton.  due to hemorrhoid issues.      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      Daughter is  at [employer]          Past Medical History:   Diagnosis Date    Abnormal Pap smear of cervix 2019     colpo benign    Gestational hypertension      HTN (hypertension)                 Past Surgical History:   Procedure Laterality Date    acl replacement Right       x 2    ANTERIOR CRUCIATE LIGAMENT REPAIR Right       SECTION         2    COLPOSCOPY   2019     Stephani Noriega MD    DILATION AND CURETTAGE OF UTERUS        UTERINE FIBROID EMBOLIZATION          Social History                Socioeconomic History    Marital status:        Spouse name: Not on file    Number of children: Not on file    Years of education: Not on file    Highest education level: Not on file   Occupational History    Not on file   Social Needs    Financial resource strain: Hard    Food insecurity:       Worry: Sometimes true       Inability: Sometimes true    Transportation needs:       Medical: No       Non-medical: No   Tobacco Use    Smoking status: Former Smoker       Packs/day: 1.00       Years: 19.00       Pack years: 19.00       Last attempt to quit: 2006       Years since quittin.1    Smokeless tobacco: Never Used   Substance and Sexual Activity    Alcohol use: Yes       Alcohol/week: 4.0 standard drinks       Types: 4 Glasses of wine per week       Comment: one drink 5 times a week    Drug use: No    Sexual activity: Yes       Partners: Male       Comment: , 2 kids   Lifestyle    Physical activity:       Days per week: 2 days       Minutes per session: 40 min    Stress: Very much   Relationships    Social connections:       Talks on phone: Three times a week       Gets together: Once a week       Attends [employer] service: Not on file       Active member of club or organization: Yes       Attends meetings of clubs or organizations: More than 4 times per year       Relationship status:    Other Topics Concern    Not on file   Social History Narrative     Lives with  and  "children. Son with DM1, daughter with chromasomal disorder.     Working with  on increasing exercise     Generally very healthy diet, low-carb     Adopted                     Family Status   Relation Name Status    Mother Rosemary Aguilar Alive    Father Curtis Trotter Alive    Brother half sib Alive    Son Vikas Alive    Sister half sib Alive    MGM Eden Aguilar (Not Specified)    PGM Corine Trotter (Not Specified)    PGF Bubba Trotter (Not Specified)    Daughter Amanda Alive    Pat Uncle Alejandro Trotter (Not Specified)            Meds and allergies: updated on EPIC          /80 (BP Location: Right arm, Patient Position: Sitting, BP Method: Large (Manual))   Pulse 71   Ht 5' 2" (1.575 m)   Wt 100 kg (220 lb 7.4 oz)   SpO2 96%   BMI 40.32 kg/m²     General: alert, oriented x 3, no apparent distress.  Affect normal  HEENT: Conjunctivae: anicteric, PERRL, EOMI, TM clear, Oralpharynx clear  Neck: supple, no thyroid enlargement, no cervical lymphadenopathy  Resp: effort normal, lungs clear bilaterally  CV: Regular rate and rhythm without murmurs, gallops or rubs, no lower extremity edema,        Labs 5/13/24 reviewed     Assessment/Plan:           1. Hypertension - controlled  2. Diabetes -  metformin  mg twice daily and decrease pioglitazone 15 mg daily. May need to add glimepiride.  3. Mood disorder-   on lexapro  4. Insomnia and restless legs- better  5. Obesity - discussed diet and exercise.   6. Vitamin D deficiency - vitamin D 2000 units daily    7. Erythrocytosis -work on wearing cpap machine more consistently.   8. Sleep apnea - on cpap  9. Plantar fasciitis - get off pioglitazone     MMG   at diagnostic imaging through Dr Noriega- 2/2/24     Saw Dr Noriega 12/6/23-     Eye exam - Nicholas Benton on [address] January 2023     colonoscopy 3/6/23 - normal - given 10 years. Alex Chang.      I will see her back in 4 months, Sooner if problems arise     "

## 2024-06-10 ENCOUNTER — PATIENT MESSAGE (OUTPATIENT)
Dept: INTERNAL MEDICINE | Facility: CLINIC | Age: 54
End: 2024-06-10
Payer: COMMERCIAL

## 2024-07-05 ENCOUNTER — PATIENT OUTREACH (OUTPATIENT)
Dept: ADMINISTRATIVE | Facility: HOSPITAL | Age: 54
End: 2024-07-05
Payer: COMMERCIAL

## 2024-07-10 NOTE — PROGRESS NOTES
Health Maintenance Due   Topic Date Due    Hepatitis C Screening  Never done    Pneumococcal Vaccines (Age 0-64) (1 of 2 - PCV) Never done    TETANUS VACCINE  Never done    Low Dose Statin  Never done    Shingles Vaccine (1 of 2) Never done    Diabetes Urine Screening  09/25/2020    COVID-19 Vaccine (2 - 2023-24 season) 09/01/2023    Eye Exam  01/31/2024       Chart reviewed and updated. Reconciled immunizations.  Outreached for urine lab and eye exam.    Melisa Biswas LPN   Clinical Care Coordinator  Primary Care and Wellness

## 2024-07-12 ENCOUNTER — PATIENT OUTREACH (OUTPATIENT)
Dept: ADMINISTRATIVE | Facility: HOSPITAL | Age: 54
End: 2024-07-12
Payer: COMMERCIAL

## 2024-07-12 DIAGNOSIS — E11.3293 TYPE 2 DIABETES MELLITUS WITH MILD NONPROLIFERATIVE RETINOPATHY OF BOTH EYES WITHOUT MACULAR EDEMA, UNSPECIFIED WHETHER LONG TERM INSULIN USE: Primary | ICD-10-CM

## 2024-07-12 NOTE — PROGRESS NOTES
Health Maintenance Due   Topic Date Due    Hepatitis C Screening  Never done    Pneumococcal Vaccines (Age 0-64) (1 of 2 - PCV) Never done    TETANUS VACCINE  Never done    Low Dose Statin  Never done    Shingles Vaccine (1 of 2) Never done    Diabetes Urine Screening  09/25/2020    COVID-19 Vaccine (2 - 2023-24 season) 09/01/2023    Eye Exam  01/31/2024       Chart reviewed and updated. Reconciled immunizations. Referral placed for GYN. Edited upcoming appt notes to obtain urine.  Melisa Biswas LPN   Clinical Care Coordinator  Primary Care and Wellness

## 2024-07-12 NOTE — LETTER
AUTHORIZATION FOR RELEASE OF   CONFIDENTIAL INFORMATION    Dear Dr.Brandon Benton,    We are seeing Gege Valenzuela, date of birth 1970, in the clinic at [facility]. Zaria Conner MD is the patient's PCP. Gege Valenzuela has an outstanding lab/procedure at the time we reviewed her chart. In order to help keep her health information updated, she has authorized us to request the following medical record(s):        (  )  MAMMOGRAM                                      (  )  COLONOSCOPY      (  )  PAP SMEAR                                          (  )  OUTSIDE LAB RESULTS     (  )  DEXA SCAN                                          ( X )  EYE EXAM            (  )  FOOT EXAM                                          (  )  ENTIRE RECORD     (  )  OUTSIDE IMMUNIZATIONS                 (  )  _______________         Please fax records to [facility], Fernandez, Sara E., MD, [phone]     If you have any questions, please contact Melisa CASTRO at [phone].           Patient Name: Gege Valenzuela  : 1970  Patient Phone #: [phone]

## 2024-08-03 DIAGNOSIS — E11.9 TYPE 2 DIABETES MELLITUS WITHOUT COMPLICATION, WITHOUT LONG-TERM CURRENT USE OF INSULIN: ICD-10-CM

## 2024-08-05 RX ORDER — METFORMIN HYDROCHLORIDE 500 MG/1
500 TABLET, EXTENDED RELEASE ORAL 2 TIMES DAILY WITH MEALS
Qty: 180 TABLET | Refills: 3
Start: 2024-08-05 | End: 2024-08-09 | Stop reason: SDUPTHER

## 2024-08-05 RX ORDER — LANCETS
1 EACH MISCELLANEOUS 2 TIMES DAILY WITH MEALS
Qty: 100 EACH | Refills: 3 | Status: SHIPPED | OUTPATIENT
Start: 2024-08-05

## 2024-08-09 RX ORDER — METFORMIN HYDROCHLORIDE 500 MG/1
500 TABLET, EXTENDED RELEASE ORAL 2 TIMES DAILY WITH MEALS
Qty: 180 TABLET | Refills: 1 | Status: SHIPPED | OUTPATIENT
Start: 2024-08-09

## 2024-08-20 DIAGNOSIS — I10 ESSENTIAL HYPERTENSION: ICD-10-CM

## 2024-08-20 NOTE — TELEPHONE ENCOUNTER
Care Due:                  Date            Visit Type   Department     Provider  --------------------------------------------------------------------------------                                EP -                              PRIMARY      [facility] INTERNAL  Last Visit: 05-      CARE ([facility])   MEDICINE       MARY MANZANARES                              EP                               PRIMARY      [facility] INTER[facility]  Next Visit: 09-      CARE ([facility])   MEDICINE       MARY MANZANARES                                                            Last  Test          Frequency    Reason                     Performed    Due Date  --------------------------------------------------------------------------------    HBA1C.......  6 months...  metFORMIN................  05-   11-    [facility] Embedded Care Due Messages. Reference number: [id number].   8/20/2024 6:02:08 PM CDT

## 2024-08-21 RX ORDER — AMLODIPINE BESYLATE 10 MG/1
TABLET ORAL
Qty: 90 TABLET | Refills: 2 | Status: SHIPPED | OUTPATIENT
Start: 2024-08-21

## 2024-08-21 NOTE — TELEPHONE ENCOUNTER
Provider Staff:  Action required for this patient    Requires labs      Please see care gap opportunities below in Care Due Message.    Thanks!  [facility]     Appointments      Date Provider   Last Visit   5/15/2024 Zaria Conner MD   Next Visit   9/25/2024 Zaria Conner MD     Refill Decision Note   Gege Valenzuela  is requesting a refill authorization.  Brief Assessment and Rationale for Refill:  Approve     Medication Therapy Plan:         Comments:     Note composed:11:07 AM 08/21/2024

## 2024-09-11 ENCOUNTER — PATIENT OUTREACH (OUTPATIENT)
Dept: ADMINISTRATIVE | Facility: HOSPITAL | Age: 54
End: 2024-09-11
Payer: COMMERCIAL

## 2024-09-11 NOTE — PROGRESS NOTES
Health Maintenance Due   Topic Date Due    Hepatitis C Screening  Never done    Pneumococcal Vaccines (Age 0-64) (1 of 2 - PCV) Never done    TETANUS VACCINE  Never done    Low Dose Statin  Never done    Shingles Vaccine (1 of 2) Never done    Diabetes Urine Screening  09/25/2020    Eye Exam  01/31/2024    Hemoglobin A1c  08/13/2024    COVID-19 Vaccine (2 - 2023-24 season) 09/01/2024    Cervical Cancer Screening  12/06/2024      Chart reviewed. Triggered LINKS. Updated Care Everywhere. Pt has upcoming pcp appt on 09/23/24.     Julia Valerio CMA  Population Health Care Coordinator  Primary Care Team

## 2024-09-19 ENCOUNTER — PATIENT MESSAGE (OUTPATIENT)
Dept: INTERNAL MEDICINE | Facility: CLINIC | Age: 54
End: 2024-09-19
Payer: COMMERCIAL

## 2024-09-19 ENCOUNTER — TELEPHONE (OUTPATIENT)
Dept: INTERNAL MEDICINE | Facility: CLINIC | Age: 54
End: 2024-09-19
Payer: COMMERCIAL

## 2024-09-19 DIAGNOSIS — E11.9 TYPE 2 DIABETES MELLITUS WITHOUT COMPLICATION, WITHOUT LONG-TERM CURRENT USE OF INSULIN: Primary | ICD-10-CM

## 2024-09-19 NOTE — TELEPHONE ENCOUNTER
Called patient to check to see if she had her eye exam this year and she states she has not been able to set up an eye appt due to she is unable to afford it. Reminded of her appt with pcp on 9.25.24 and she voiced understanding.   Symone Yun RN HC

## 2024-09-25 ENCOUNTER — OFFICE VISIT (OUTPATIENT)
Dept: INTERNAL MEDICINE | Facility: CLINIC | Age: 54
End: 2024-09-25
Payer: COMMERCIAL

## 2024-09-25 VITALS
HEART RATE: 76 BPM | HEIGHT: 62 IN | OXYGEN SATURATION: 97 % | SYSTOLIC BLOOD PRESSURE: 122 MMHG | WEIGHT: 210.88 LBS | DIASTOLIC BLOOD PRESSURE: 68 MMHG | BODY MASS INDEX: 38.8 KG/M2

## 2024-09-25 DIAGNOSIS — E11.3293 TYPE 2 DIABETES MELLITUS WITH MILD NONPROLIFERATIVE RETINOPATHY OF BOTH EYES WITHOUT MACULAR EDEMA, UNSPECIFIED WHETHER LONG TERM INSULIN USE: Primary | ICD-10-CM

## 2024-09-25 DIAGNOSIS — D58.2 ELEVATED HEMOGLOBIN: ICD-10-CM

## 2024-09-25 DIAGNOSIS — L65.9 THINNING HAIR: ICD-10-CM

## 2024-09-25 DIAGNOSIS — G47.33 OSA (OBSTRUCTIVE SLEEP APNEA): ICD-10-CM

## 2024-09-25 DIAGNOSIS — I10 ESSENTIAL HYPERTENSION: ICD-10-CM

## 2024-09-25 DIAGNOSIS — E66.01 SEVERE OBESITY (BMI 35.0-39.9) WITH COMORBIDITY: ICD-10-CM

## 2024-09-25 PROCEDURE — 3078F DIAST BP <80 MM HG: CPT | Mod: CPTII,S$GLB,, | Performed by: INTERNAL MEDICINE

## 2024-09-25 PROCEDURE — 3008F BODY MASS INDEX DOCD: CPT | Mod: CPTII,S$GLB,, | Performed by: INTERNAL MEDICINE

## 2024-09-25 PROCEDURE — 99214 OFFICE O/P EST MOD 30 MIN: CPT | Mod: S$GLB,,, | Performed by: INTERNAL MEDICINE

## 2024-09-25 PROCEDURE — 99999 PR PBB SHADOW E&M-EST. PATIENT-LVL IV: CPT | Mod: PBBFAC,,, | Performed by: INTERNAL MEDICINE

## 2024-09-25 PROCEDURE — 3046F HEMOGLOBIN A1C LEVEL >9.0%: CPT | Mod: CPTII,S$GLB,, | Performed by: INTERNAL MEDICINE

## 2024-09-25 PROCEDURE — 3074F SYST BP LT 130 MM HG: CPT | Mod: CPTII,S$GLB,, | Performed by: INTERNAL MEDICINE

## 2024-09-25 PROCEDURE — 1159F MED LIST DOCD IN RCRD: CPT | Mod: CPTII,S$GLB,, | Performed by: INTERNAL MEDICINE

## 2024-09-25 RX ORDER — FINASTERIDE 5 MG/1
5 TABLET, FILM COATED ORAL DAILY
Qty: 30 TABLET | Refills: 11 | Status: SHIPPED | OUTPATIENT
Start: 2024-09-25 | End: 2024-09-25

## 2024-09-25 RX ORDER — FINASTERIDE 5 MG/1
5 TABLET, FILM COATED ORAL DAILY
Qty: 30 TABLET | Refills: 11 | Status: SHIPPED | OUTPATIENT
Start: 2024-09-25 | End: 2025-09-25

## 2024-09-25 NOTE — PATIENT INSTRUCTIONS
Semaglutide 0.25 mg once weekly for 4 weeks (0.125 ml) then   Semaglutide 0.5 mg once weekly for 4 weeks (0.25 ml) then  Semaglutide 0.75 mg once weekly for 4 weeks (0.375 ml) then  Semaglutide 1 mg once weekly (0.5 ml)    Finasteride 5 mg 1/2 tablet daily

## 2024-09-25 NOTE — PROGRESS NOTES
Chief Complaint:   Follow up of blood pressure, blood sugar, mood disorder     HPI:This is a 54 year old woman who presents for follow up of above.     Her son Isai is now a freshman at [employer].  HE is doing well. He is managing his type 1 diabetes.  Amanda is at [employer] - will be 16 in November. Brother  - did not take care of himself. Was likely a diabetic. Found  on her mothers back porch.     She is feeling fine. She is off pioglitazone - she gained weight and swelled.  She is taking metformin  mg 2-3 times daily.  BLood sugars have been in the 200's range in the morning. She has been more active. She is watching her diet.  She has lost 10 pounds since our last visit.      Yoga has helped her plantar fasciitis.  .       She has a CPAP machine nightly all night - She sleeps about 7 hours at night.  Occasionally may take off in the middle of the night.       She has been taking amlodipine 10 mg daily in the evening.  No leg swelling, chest pain, shortness of breath.     She will take diazepam 5 mg at bedtime if she knows she cannot sleep (rare).      She is back on Lexapro 5 mg daily which is helping with stressors in life.  Running household, 2 children (one with type 1 diabetes, one with developmental disability), 2 jobs.       No periods - last one period .2022 - spotting lasted 3 days. No hot fashes or night sweats.  She runs hotter. Hair has been getting thinner the last several years.      She had a colonoscopy at [facility] 5 years ago with robyn Hall's group - Alex Chang.       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              Past Medical History:    Diagnosis Date    Abnormal Pap smear of cervix 2019     colpo benign    Gestational hypertension      HTN (hypertension)                 Past Surgical History:   Procedure Laterality Date    acl replacement Right       x 2    ANTERIOR CRUCIATE LIGAMENT REPAIR Right       SECTION         2    COLPOSCOPY   2019     Stephani Noriega MD    DILATION AND CURETTAGE OF UTERUS        UTERINE FIBROID EMBOLIZATION          Social History                Socioeconomic History    Marital status:        Spouse name: Not on file    Number of children: Not on file    Years of education: Not on file    Highest education level: Not on file   Occupational History    Not on file   Social Needs    Financial resource strain: Hard    Food insecurity:       Worry: Sometimes true       Inability: Sometimes true    Transportation needs:       Medical: No       Non-medical: No   Tobacco Use    Smoking status: Former Smoker       Packs/day: 1.00       Years: 19.00       Pack years: 19.00       Last attempt to quit: 2006       Years since quittin.1    Smokeless tobacco: Never Used   Substance and Sexual Activity    Alcohol use: Yes       Alcohol/week: 4.0 standard drinks       Types: 4 Glasses of wine per week       Comment: one drink 5 times a week    Drug use: No    Sexual activity: Yes       Partners: Male       Comment: , 2 kids   Lifestyle    Physical activity:       Days per week: 2 days       Minutes per session: 40 min    Stress: Very much   Relationships    Social connections:       Talks on phone: Three times a week       Gets together: Once a week       Attends [employer] service: Not on file       Active member of club or organization: Yes       Attends meetings of clubs or organizations: More than 4 times per year       Relationship status:    Other Topics Concern    Not on file   Social History Narrative     Lives with  and children. Son with DM1, daughter with chromasomal  "disorder.     Working with  on increasing exercise     Generally very healthy diet, low-carb     Adopted                     Family Status   Relation Name Status    Mother Rosemary Aguilar Alive    Father Curtis Trotter Alive    Brother half sib Alive    Son Vikas Alive    Sister half sib Alive    MGM Eden Aguilar (Not Specified)    PGM Corine Trotter (Not Specified)    PGF Bubba Trotter (Not Specified)    Daughter Amanda Alive    Pat Uncle Alejandro Trotter (Not Specified)            Meds and allergies: updated on EPIC       Blood pressure 122/68, pulse 76, height 5' 2" (1.575 m), weight 95.7 kg (210 lb 13.9 oz), SpO2 97%.  s      General: alert, oriented x 3, no apparent distress.  Affect normal  HEENT: Conjunctivae: anicteric, PERRL, EOMI, TM clear, Oralpharynx clear  Neck: supple, no thyroid enlargement, no cervical lymphadenopathy  Resp: effort normal, lungs clear bilaterally  CV: Regular rate and rhythm without murmurs, gallops or rubs, no lower extremity edema,        Labs 9/2024 reviewed     Assessment/Plan:           1. Hypertension - controlled  2. Diabetes -uncontrolled. Continue  metformin  mg twice daily add compounded semaglutide (due to cost) -     Semaglutide 0.25 mg once weekly for 4 weeks (0.125 ml) then   Semaglutide 0.5 mg once weekly for 4 weeks (0.25 ml) then  Semaglutide 0.75 mg once weekly for 4 weeks (0.375 ml) then  Semaglutide 1 mg once weekly (0.5 ml)    Side effects discussed    3. Mood disorder-   on lexapro  4. Insomnia and restless legs- better  5. Obesity - discussed diet and exercise. Semiglutide  6. Vitamin D deficiency - vitamin D 2000 units daily    7. Erythrocytosis -wearing cpap machine more consistently.   8. Sleep apnea - on cpap  9. Male pattern baldness - finasteride 5 mg 1/2 tablet daily       MMG   at diagnostic imaging through Dr Noriega- 2/2/24     Saw Dr Noriega 12/6/23-     Eye exam - Nicholas Benton on [facility] January 2023   "   colonoscopy 3/6/23 - normal - given 10 years. Alex Chang.      I will see her back in 4 months, Sooner if problems arise

## 2024-12-05 ENCOUNTER — OFFICE VISIT (OUTPATIENT)
Dept: INTERNAL MEDICINE | Facility: CLINIC | Age: 54
End: 2024-12-05
Payer: COMMERCIAL

## 2024-12-05 VITALS
SYSTOLIC BLOOD PRESSURE: 122 MMHG | OXYGEN SATURATION: 98 % | HEIGHT: 62 IN | WEIGHT: 207.56 LBS | HEART RATE: 65 BPM | DIASTOLIC BLOOD PRESSURE: 82 MMHG | BODY MASS INDEX: 38.2 KG/M2

## 2024-12-05 DIAGNOSIS — Z00.00 ROUTINE GENERAL MEDICAL EXAMINATION AT A HEALTH CARE FACILITY: Primary | ICD-10-CM

## 2024-12-05 PROCEDURE — 3008F BODY MASS INDEX DOCD: CPT | Mod: CPTII,S$GLB,, | Performed by: INTERNAL MEDICINE

## 2024-12-05 PROCEDURE — 1159F MED LIST DOCD IN RCRD: CPT | Mod: CPTII,S$GLB,, | Performed by: INTERNAL MEDICINE

## 2024-12-05 PROCEDURE — 3046F HEMOGLOBIN A1C LEVEL >9.0%: CPT | Mod: CPTII,S$GLB,, | Performed by: INTERNAL MEDICINE

## 2024-12-05 PROCEDURE — 99396 PREV VISIT EST AGE 40-64: CPT | Mod: S$GLB,,, | Performed by: INTERNAL MEDICINE

## 2024-12-05 PROCEDURE — 3079F DIAST BP 80-89 MM HG: CPT | Mod: CPTII,S$GLB,, | Performed by: INTERNAL MEDICINE

## 2024-12-05 PROCEDURE — 3074F SYST BP LT 130 MM HG: CPT | Mod: CPTII,S$GLB,, | Performed by: INTERNAL MEDICINE

## 2024-12-05 PROCEDURE — 99999 PR PBB SHADOW E&M-EST. PATIENT-LVL IV: CPT | Mod: PBBFAC,,, | Performed by: INTERNAL MEDICINE

## 2024-12-05 NOTE — PROGRESS NOTES
Chief Complaint:   Annual exam and Follow up of blood pressure, blood sugar, mood disorder     HPI:This is a 54 year old woman who presents for follow up of above.      Her son Isai is now a freshman at [employer].  HE is doing well. He is managing his type 1 diabetes.  Amanda is at [employer] - turned  16 in November. Brother  - did not take care of himself. Was likely a diabetic. Found  on her mothers back porch.     She is taking semaglutide 0.75 mg once a week.  She will have occasional diarrhea - every other day.   No nausea.  No change in heartburn -  Couple times a week - she drinks more water which helps.   Clothes are loser. She is losing weight. Blood sugars are coming down.     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 NO bald spots.   Hair is still thinner.      Yoga has helped her plantar fasciitis.  .       She has a CPAP machine nightly all night - She sleeps about 7 hours at night.  Occasionally may take off in the middle of the night.       She has been taking amlodipine 10 mg daily in the evening.  No leg swelling, chest pain, shortness of breath.      She will take diazepam 5 mg at bedtime if she knows she cannot sleep (rare).      She is on Lexapro 5 mg daily which is helping with stressors in life.  Running household, 2 children (one with type 1 diabetes, one with developmental disability), 2 jobs.       No periods - last one period 2022 - spotting lasted 3 days. No hot fashes or night sweats.  She runs hotter. Hair has been getting thinner the last several years.      She had a colonoscopy at [facility] 5 years ago with robyn Hall's group - Alex Chang.       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              Past Medical History:   Diagnosis Date    Abnormal Pap smear of cervix 2019     colpo benign    Gestational hypertension      HTN (hypertension)                 Past Surgical History:   Procedure Laterality Date    acl replacement Right       x 2    ANTERIOR CRUCIATE LIGAMENT REPAIR Right       SECTION         2    COLPOSCOPY   2019     Stephani Noriega MD    DILATION AND CURETTAGE OF UTERUS        UTERINE FIBROID EMBOLIZATION          Social History                Socioeconomic History    Marital status:        Spouse name: Not on file    Number of children: Not on file    Years of education: Not on file    Highest education level: Not on file   Occupational History    Not on file   Social Needs    Financial resource strain: Hard    Food insecurity:       Worry: Sometimes true       Inability: Sometimes true    Transportation needs:       Medical: No       Non-medical: No   Tobacco Use    Smoking status: Former Smoker       Packs/day: 1.00       Years: 19.00       Pack years: 19.00       Last attempt to quit: 2006       Years since quittin.1    Smokeless tobacco: Never Used   Substance and Sexual Activity    Alcohol use: Yes       Alcohol/week: 4.0 standard drinks       Types: 4 Glasses of wine per week       Comment: one drink 5 times a week    Drug use: No    Sexual activity: Yes       Partners: Male       Comment: , 2 kids   Lifestyle    Physical activity:       Days per week: 2 days       Minutes per session: 40 min    Stress: Very much   Relationships    Social connections:       Talks on phone: Three times a week       Gets together: Once a week       Attends Taoist service: Not on file       Active member of club or organization: Yes       Attends meetings of clubs or organizations: More than 4 times per year       Relationship status:    Other Topics Concern    Not on file  "  Social History Narrative     Lives with  and children. Son with DM1, daughter with chromasomal disorder.     Working with  on increasing exercise     Generally very healthy diet, low-carb     Adopted                     Family Status   Relation Name Status    Mother Rosemary Aguilar Alive    Father Curtis Trotter Alive    Brother half sib Alive    Son Vikas Alive    Sister half sib Alive    MGM Eden Aguilar (Not Specified)    PGM Corine Trotter (Not Specified)    PGF Bubba Trotter (Not Specified)    Daughter Amanda Alive    Pat Uncle Alejandro Trotter (Not Specified)            Meds and allergies: updated on EPIC       /82 (BP Location: Right arm, Patient Position: Sitting)   Pulse 65   Ht 5' 2" (1.575 m)   Wt 94.2 kg (207 lb 9 oz)   SpO2 98%   BMI 37.96 kg/m²         General: alert, oriented x 3, no apparent distress.  Affect normal  HEENT: Conjunctivae: anicteric, PERRL, EOMI, TM clear, Oralpharynx clear  Neck: supple, no thyroid enlargement, no cervical lymphadenopathy  Resp: effort normal, lungs clear bilaterally  CV: Regular rate and rhythm without murmurs, gallops or rubs, no lower extremity edema,        Labs 9/2024 reviewed     Assessment/Plan:    Annual exam - discussed diet, exercise and safety issues.             1. Hypertension - controlled  2. Diabetes . Continue  metformin  mg one daily add compounded (due to cost) - Semaglutide 0.75 mg once weekly for total of 4 weeks (0.375 ml) then Semaglutide 1 mg once weekly (0.5 ml)     3. Mood disorder-   on lexapro  4. Insomnia and restless legs- better  5. Obesity - discussed diet and exercise. Semiglutide  6. Vitamin D deficiency - vitamin D 2000 units daily    7. Erythrocytosis -wearing cpap machine more consistently.   8. Sleep apnea - on cpap  9. Male pattern baldness - finasteride 5 mg 1/2 tablet daily        MMG   at diagnostic imaging through Dr Noriega- 2/2/24     Saw Dr Noriega 12/6/23-     Eye exam - " Nicholas Benton on [address] January 2023     colonoscopy 3/6/23 - normal - given 10 years. Alex Chang.      I will see her back in 4 months, Sooner if problems arise

## 2024-12-05 NOTE — PATIENT INSTRUCTIONS
Semaglutide 0.75 mg once weekly for total of 4 weeks (0.375 ml) then  Semaglutide 1 mg once weekly (0.5 ml)

## 2025-02-12 DIAGNOSIS — Z12.31 OTHER SCREENING MAMMOGRAM: ICD-10-CM

## 2025-02-13 DIAGNOSIS — F43.0 STRESS REACTION: ICD-10-CM

## 2025-02-14 RX ORDER — ESCITALOPRAM OXALATE 5 MG/1
5 TABLET ORAL DAILY
Qty: 90 TABLET | Refills: 3 | Status: SHIPPED | OUTPATIENT
Start: 2025-02-14

## 2025-02-14 NOTE — TELEPHONE ENCOUNTER
Care Due:                  Date            Visit Type   Department     Provider  --------------------------------------------------------------------------------                                EP -                              PRIMARY      [facility] INTERNAL  Last Visit: 12-      CARE ([facility])   MEDICINE       MARY MANZANARES                              EP                               PRIMARY      [facility] INTERNAL  Next Visit: 03-      CARE ([facility])   MEDICINE       MARY MANZANARES                                                            Last  Test          Frequency    Reason                     Performed    Due Date  --------------------------------------------------------------------------------    HBA1C.......  6 months...  metFORMIN, semaglutide,..  09- 03-    [facility] Embedded Care Due Messages. Reference number: [id number].   2/13/2025 11:05:55 PM CST

## 2025-03-07 ENCOUNTER — PATIENT OUTREACH (OUTPATIENT)
Dept: INTERNAL MEDICINE | Facility: CLINIC | Age: 55
End: 2025-03-07
Payer: COMMERCIAL

## 2025-03-07 DIAGNOSIS — Z12.4 CERVICAL CANCER SCREENING: ICD-10-CM

## 2025-03-07 DIAGNOSIS — Z01.00 ENCOUNTER FOR ROUTINE EYE AND VISION EXAMINATION: Primary | ICD-10-CM

## 2025-03-07 DIAGNOSIS — E11.621 CHRONIC DIABETIC ULCER OF RIGHT FOOT DETERMINED BY EXAMINATION: ICD-10-CM

## 2025-03-07 DIAGNOSIS — Z00.00 HEALTHCARE MAINTENANCE: ICD-10-CM

## 2025-03-07 DIAGNOSIS — E11.9 ENCOUNTER FOR DIABETIC FOOT EXAM: ICD-10-CM

## 2025-03-07 DIAGNOSIS — L97.519 CHRONIC DIABETIC ULCER OF RIGHT FOOT DETERMINED BY EXAMINATION: ICD-10-CM

## 2025-03-07 DIAGNOSIS — E11.3293 TYPE 2 DIABETES MELLITUS WITH MILD NONPROLIFERATIVE RETINOPATHY OF BOTH EYES WITHOUT MACULAR EDEMA, UNSPECIFIED WHETHER LONG TERM INSULIN USE: ICD-10-CM

## 2025-03-07 NOTE — PROGRESS NOTES
Chart reviewed and updated. Reconciled immunizations, health maintenance and care everywhere.  Placed referrals for Gynecology, Podiatry, Optometry and labs.      Pamela Ybarra, [facility]  Panel Care Coordinator  [facility]  [phone]  For Zaria Conner MD

## 2025-03-31 ENCOUNTER — OFFICE VISIT (OUTPATIENT)
Dept: INTERNAL MEDICINE | Facility: CLINIC | Age: 55
End: 2025-03-31
Payer: COMMERCIAL

## 2025-03-31 VITALS
SYSTOLIC BLOOD PRESSURE: 130 MMHG | OXYGEN SATURATION: 96 % | DIASTOLIC BLOOD PRESSURE: 80 MMHG | HEIGHT: 62 IN | BODY MASS INDEX: 37.29 KG/M2 | HEART RATE: 71 BPM | WEIGHT: 202.63 LBS

## 2025-03-31 DIAGNOSIS — E11.3293 TYPE 2 DIABETES MELLITUS WITH MILD NONPROLIFERATIVE RETINOPATHY OF BOTH EYES WITHOUT MACULAR EDEMA, UNSPECIFIED WHETHER LONG TERM INSULIN USE: Primary | ICD-10-CM

## 2025-03-31 DIAGNOSIS — I10 ESSENTIAL HYPERTENSION: ICD-10-CM

## 2025-03-31 DIAGNOSIS — G47.33 OSA (OBSTRUCTIVE SLEEP APNEA): ICD-10-CM

## 2025-03-31 DIAGNOSIS — R10.11 RUQ PAIN: ICD-10-CM

## 2025-03-31 DIAGNOSIS — D58.2 ELEVATED HEMOGLOBIN: ICD-10-CM

## 2025-03-31 DIAGNOSIS — E66.01 SEVERE OBESITY (BMI 35.0-39.9) WITH COMORBIDITY: ICD-10-CM

## 2025-03-31 PROCEDURE — 3075F SYST BP GE 130 - 139MM HG: CPT | Mod: CPTII,S$GLB,, | Performed by: INTERNAL MEDICINE

## 2025-03-31 PROCEDURE — 99214 OFFICE O/P EST MOD 30 MIN: CPT | Mod: S$GLB,,, | Performed by: INTERNAL MEDICINE

## 2025-03-31 PROCEDURE — 99999 PR PBB SHADOW E&M-EST. PATIENT-LVL IV: CPT | Mod: PBBFAC,,, | Performed by: INTERNAL MEDICINE

## 2025-03-31 PROCEDURE — 3052F HG A1C>EQUAL 8.0%<EQUAL 9.0%: CPT | Mod: CPTII,S$GLB,, | Performed by: INTERNAL MEDICINE

## 2025-03-31 PROCEDURE — 1159F MED LIST DOCD IN RCRD: CPT | Mod: CPTII,S$GLB,, | Performed by: INTERNAL MEDICINE

## 2025-03-31 PROCEDURE — 3008F BODY MASS INDEX DOCD: CPT | Mod: CPTII,S$GLB,, | Performed by: INTERNAL MEDICINE

## 2025-03-31 PROCEDURE — 3079F DIAST BP 80-89 MM HG: CPT | Mod: CPTII,S$GLB,, | Performed by: INTERNAL MEDICINE

## 2025-03-31 NOTE — PROGRESS NOTES
Chief Complaint:    Follow up of blood pressure, blood sugar, mood disorder     HPI:This is a 54 year old woman who presents for follow up of above.      Her son Isai is now a freshman at [employer].  HE is doing well. He is managing his type 1 diabetes.  Amanda is at [employer] [employer] - turned  16 in November. Brother  - did not take care of himself. Was likely a diabetic. Found  on her mothers back porch.      She is taking semaglutide 1 ml once week once a week - (2 mg) f or the last month.   Occasional stool urgency - with or without metformin - takes the meformin several times a week. . No nausea or vomiting NO change in heartburn.     She has lost 5 pounds since our last visit.     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 NO bald spots.   Hair is still thin.      Yoga has helped her plantar fasciitis.  No foot pain.       She has a CPAP machine nightly all night - She sleeps about 7 hours at night.  Occasionally may take off in the middle of the night. She plans to donate blood regularly.   A few weeks ago she gave blood.        She has been taking amlodipine 10 mg daily in the evening.  No leg swelling, chest pain, shortness of breath.      She will take diazepam 5 mg at bedtime if she knows she cannot sleep (rare).      She is on Lexapro 5 mg daily which is helping with stressors in life.  Running household, 2 children (one with type 1 diabetes, one with developmental disability), 2 jobs.       No periods - last one period 2022 - spotting lasted 3 days. No hot fashes or night sweats.  She runs hotter. Hair has been getting thinner the last several years.     She may have passed a kidney stone one night in the middle of the night.  Had a dull pain on the right upper quadrant.  She got up and moved around and the pain improved. The second episode lasted a few hours and she vomited     She had a colonoscopy at [facility] 5 years ago with robyn  [facility] - Alex Chang.       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              Past Medical History:   Diagnosis Date    Abnormal Pap smear of cervix 2019     colpo benign    Gestational hypertension      HTN (hypertension)                 Past Surgical History:   Procedure Laterality Date    acl replacement Right       x 2    ANTERIOR CRUCIATE LIGAMENT REPAIR Right       SECTION         2    COLPOSCOPY   2019     Stephani Noriega MD    DILATION AND CURETTAGE OF UTERUS        UTERINE FIBROID EMBOLIZATION          Social History                Socioeconomic History    Marital status:        Spouse name: Not on file    Number of children: Not on file    Years of education: Not on file    Highest education level: Not on file   Occupational History    Not on file   Social Needs    Financial resource strain: Hard    Food insecurity:       Worry: Sometimes true       Inability: Sometimes true    Transportation needs:       Medical: No       Non-medical: No   Tobacco Use    Smoking status: Former Smoker       Packs/day: 1.00       Years: 19.00       Pack years: 19.00       Last attempt to quit: 2006       Years since quittin.1    Smokeless tobacco: Never Used   Substance and Sexual Activity    Alcohol use: Yes       Alcohol/week: 4.0 standard drinks       Types: 4 Glasses of wine per week       Comment: one drink 5 times a week    Drug use: No    Sexual activity: Yes       Partners: Male       Comment: , 2 kids   Lifestyle    Physical activity:       Days per week: 2 days       Minutes per session: 40 min    Stress: Very much   Relationships    Social connections:       Talks on  "phone: Three times a week       Gets together: Once a week       Attends [employer] service: Not on file       Active member of club or organization: Yes       Attends meetings of clubs or organizations: More than 4 times per year       Relationship status:    Other Topics Concern    Not on file   Social History Narrative     Lives with  and children. Son with DM1, daughter with chromasomal disorder.     Working with  on increasing exercise     Generally very healthy diet, low-carb     Adopted                     Family Status   Relation Name Status    Mother Rosemary Aguilar Alive    Father Curtis Trotter Alive    Brother half sib Alive    Son Vikas Alive    Sister half sib Alive    MGM Eden Aguilar (Not Specified)    PGM Corine Trotter (Not Specified)    PGF Bubba Trotter (Not Specified)    Daughter Amanda Alive    Pat Uncle Alejandro Trotter (Not Specified)            Meds and allergies: updated on EPIC       /82 (BP Location: Right arm, Patient Position: Sitting)   Pulse 65   Ht 5' 2" (1.575 m)   Wt 94.2 kg (207 lb 9 oz)   SpO2 98%   BMI 37.96 kg/m²          General: alert, oriented x 3, no apparent distress.  Affect normal  HEENT: Conjunctivae: anicteric, PERRL, EOMI, TM clear, Oralpharynx clear  Neck: supple, no thyroid enlargement, no cervical lymphadenopathy  Resp: effort normal, lungs clear bilaterally  CV: Regular rate and rhythm without murmurs, gallops or rubs, no lower extremity edema,     Protective Sensation (w/ 10 gram monofilament):  Right: Intact  Left: Intact    Visual Inspection:  Normal -  Bilateral      Pedal Pulses:   Right: Present  Left: Present    Posterior Tibialis Pulses:   Right:Present  Left: Present         Labs 3/24/25 reviewed     Assessment/Plan:     Annual exam - discussed diet, exercise and safety issues.              1. Hypertension - controlled  2. Diabetes . Continue  metformin  mg one daily add compounded (due to cost) - " Semaglutide 1 ml once weekly.     3. Mood disorder-   on lexapro  4. Insomnia and restless legs- better  5. Obesity - discussed diet and exercise. Semiglutide  6. Vitamin D deficiency - vitamin D 2000 units daily    7. Erythrocytosis -wearing cpap machine more consistently.  Will donate blood regularly.   8. 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 RUQ pain - Abdominal US        MMG   at diagnostic imaging through Dr Noriega- 2/2/24     Saw Dr Noriega 3/24/25     Eye exam - Nicholas Benton on [facility] January 2023     colonoscopy 3/6/23 - normal - given 10 years. Alex Chang.      I will see her back in 4 months, Sooner if problems arise

## 2025-05-20 RX ORDER — MELOXICAM 15 MG/1
15 TABLET ORAL
Qty: 30 TABLET | Refills: 1 | OUTPATIENT
Start: 2025-05-20

## 2025-05-20 NOTE — TELEPHONE ENCOUNTER
Refill Decision Note   Gege Valenzuela  is requesting a refill authorization.  Brief Assessment and Rationale for Refill:  Quick Discontinue     Medication Therapy Plan:         Comments:     Note composed:8:20 AM 05/20/2025             Appointments     Last Visit   3/31/2025 Zaria Conner MD   Next Visit   8/4/2025 Zaria Conner MD

## 2025-05-20 NOTE — TELEPHONE ENCOUNTER
No care due was identified.  [facility] Embedded Care Due Messages. Reference number: [id number].   5/20/2025 5:24:57 AM CDT

## 2025-05-21 ENCOUNTER — HOSPITAL ENCOUNTER (OUTPATIENT)
Dept: RADIOLOGY | Facility: OTHER | Age: 55
Discharge: HOME | End: 2025-05-21
Attending: INTERNAL MEDICINE
Payer: COMMERCIAL

## 2025-05-21 DIAGNOSIS — R10.11 RUQ PAIN: ICD-10-CM

## 2025-05-21 PROCEDURE — 76705 ECHO EXAM OF ABDOMEN: CPT | Mod: 26,,, | Performed by: RADIOLOGY

## 2025-05-21 PROCEDURE — 76705 ECHO EXAM OF ABDOMEN: CPT | Mod: TC

## 2025-05-28 ENCOUNTER — PATIENT MESSAGE (OUTPATIENT)
Dept: INTERNAL MEDICINE | Facility: CLINIC | Age: 55
End: 2025-05-28
Payer: COMMERCIAL

## 2025-06-01 ENCOUNTER — PATIENT MESSAGE (OUTPATIENT)
Dept: INTERNAL MEDICINE | Facility: CLINIC | Age: 55
End: 2025-06-01
Payer: COMMERCIAL

## 2025-06-10 DIAGNOSIS — I10 ESSENTIAL HYPERTENSION: ICD-10-CM

## 2025-06-10 RX ORDER — AMLODIPINE BESYLATE 10 MG/1
10 TABLET ORAL
Qty: 90 TABLET | Refills: 3 | Status: SHIPPED | OUTPATIENT
Start: 2025-06-10

## 2025-06-10 NOTE — TELEPHONE ENCOUNTER
No care due was identified.  [facility] Embedded Care Due Messages. Reference number: [id number].   6/10/2025 5:28:34 AM CDT

## 2025-06-10 NOTE — TELEPHONE ENCOUNTER
Refill Decision Note   Gege Bianca  is requesting a refill authorization.  Brief Assessment and Rationale for Refill:  Approve     Medication Therapy Plan:         Comments:     Note composed:7:05 AM 06/10/2025

## 2025-06-18 NOTE — TELEPHONE ENCOUNTER
No care due was identified.  Health [facility] Embedded Care Due Messages. Reference number: [id number].   11/16/2023 8:36:53 PM CST   [Well Developed] : well developed [Well Nourished] : well nourished [No Acute Distress] : no acute distress [Normal Conjunctiva] : normal conjunctiva [Normal Venous Pressure] : normal venous pressure [No Carotid Bruit] : no carotid bruit [Normal S1, S2] : normal S1, S2 [No Rub] : no rub [No Gallop] : no gallop [Rhythm Regular] : regular [Normal S1] : normal S1 [Normal S2] : normal S2 [No Murmur] : no murmurs heard [No Pitting Edema] : no pitting edema present [Right Carotid Bruit] : no bruit heard over the right carotid [Left Carotid Bruit] : no bruit heard over the left carotid [No Abnormalities] : the abdominal aorta was not enlarged and no bruit was heard [Clear Lung Fields] : clear lung fields [Good Air Entry] : good air entry [No Respiratory Distress] : no respiratory distress  [Soft] : abdomen soft [Non Tender] : non-tender [No Masses/organomegaly] : no masses/organomegaly [Normal Bowel Sounds] : normal bowel sounds [Normal Gait] : normal gait [No Edema] : no edema [No Cyanosis] : no cyanosis [No Clubbing] : no clubbing [No Varicosities] : no varicosities [No Rash] : no rash [No Skin Lesions] : no skin lesions [Moves all extremities] : moves all extremities [No Focal Deficits] : no focal deficits [Normal Speech] : normal speech [Alert and Oriented] : alert and oriented [Normal memory] : normal memory

## 2025-07-21 ENCOUNTER — PATIENT MESSAGE (OUTPATIENT)
Dept: ADMINISTRATIVE | Facility: HOSPITAL | Age: 55
End: 2025-07-21
Payer: COMMERCIAL

## 2025-07-28 ENCOUNTER — TELEPHONE (OUTPATIENT)
Dept: INTERNAL MEDICINE | Facility: CLINIC | Age: 55
End: 2025-07-28
Payer: COMMERCIAL

## 2025-07-28 ENCOUNTER — PATIENT MESSAGE (OUTPATIENT)
Dept: INTERNAL MEDICINE | Facility: CLINIC | Age: 55
End: 2025-07-28
Payer: COMMERCIAL

## 2025-07-28 DIAGNOSIS — E11.3293 TYPE 2 DIABETES MELLITUS WITH MILD NONPROLIFERATIVE RETINOPATHY OF BOTH EYES WITHOUT MACULAR EDEMA, UNSPECIFIED WHETHER LONG TERM INSULIN USE: ICD-10-CM

## 2025-08-04 ENCOUNTER — OFFICE VISIT (OUTPATIENT)
Dept: INTERNAL MEDICINE | Facility: CLINIC | Age: 55
End: 2025-08-04
Payer: COMMERCIAL

## 2025-08-04 VITALS
DIASTOLIC BLOOD PRESSURE: 76 MMHG | OXYGEN SATURATION: 97 % | HEIGHT: 62 IN | WEIGHT: 204.13 LBS | SYSTOLIC BLOOD PRESSURE: 120 MMHG | BODY MASS INDEX: 37.56 KG/M2 | HEART RATE: 68 BPM

## 2025-08-04 DIAGNOSIS — E11.3293 TYPE 2 DIABETES MELLITUS WITH MILD NONPROLIFERATIVE RETINOPATHY OF BOTH EYES WITHOUT MACULAR EDEMA, UNSPECIFIED WHETHER LONG TERM INSULIN USE: Primary | ICD-10-CM

## 2025-08-04 DIAGNOSIS — I10 ESSENTIAL HYPERTENSION: ICD-10-CM

## 2025-08-04 DIAGNOSIS — D58.2 ELEVATED HEMOGLOBIN: ICD-10-CM

## 2025-08-04 DIAGNOSIS — E66.01 SEVERE OBESITY (BMI 35.0-39.9) WITH COMORBIDITY: ICD-10-CM

## 2025-08-04 DIAGNOSIS — G47.33 OSA (OBSTRUCTIVE SLEEP APNEA): ICD-10-CM

## 2025-08-04 PROCEDURE — 3052F HG A1C>EQUAL 8.0%<EQUAL 9.0%: CPT | Mod: CPTII,S$GLB,, | Performed by: INTERNAL MEDICINE

## 2025-08-04 PROCEDURE — 3066F NEPHROPATHY DOC TX: CPT | Mod: CPTII,S$GLB,, | Performed by: INTERNAL MEDICINE

## 2025-08-04 PROCEDURE — G2211 COMPLEX E/M VISIT ADD ON: HCPCS | Mod: S$GLB,,, | Performed by: INTERNAL MEDICINE

## 2025-08-04 PROCEDURE — 99214 OFFICE O/P EST MOD 30 MIN: CPT | Mod: S$GLB,,, | Performed by: INTERNAL MEDICINE

## 2025-08-04 PROCEDURE — 3061F NEG MICROALBUMINURIA REV: CPT | Mod: CPTII,S$GLB,, | Performed by: INTERNAL MEDICINE

## 2025-08-04 PROCEDURE — 3074F SYST BP LT 130 MM HG: CPT | Mod: CPTII,S$GLB,, | Performed by: INTERNAL MEDICINE

## 2025-08-04 PROCEDURE — 99999 PR PBB SHADOW E&M-EST. PATIENT-LVL III: CPT | Mod: PBBFAC,,, | Performed by: INTERNAL MEDICINE

## 2025-08-04 PROCEDURE — 3008F BODY MASS INDEX DOCD: CPT | Mod: CPTII,S$GLB,, | Performed by: INTERNAL MEDICINE

## 2025-08-04 PROCEDURE — 1159F MED LIST DOCD IN RCRD: CPT | Mod: CPTII,S$GLB,, | Performed by: INTERNAL MEDICINE

## 2025-08-04 PROCEDURE — 3078F DIAST BP <80 MM HG: CPT | Mod: CPTII,S$GLB,, | Performed by: INTERNAL MEDICINE

## 2025-08-04 RX ORDER — PIOGLITAZONE 15 MG/1
15 TABLET ORAL DAILY
Qty: 90 TABLET | Refills: 3 | Status: SHIPPED | OUTPATIENT
Start: 2025-08-04 | End: 2026-08-04

## 2025-08-04 NOTE — PROGRESS NOTES
Chief Complaint:  Follow up of blood pressure, blood sugar, mood disorder     HPI:This is a 54 year old woman who presents for follow up of above.      Her son Isai is now a freshman at [employer].  HE is doing well. He is managing his type 1 diabetes.  Amanda is at [employer] - turned  16 in November. Brother  - did not take care of himself. Was likely a diabetic. Found  on her mothers back porch.      She is taking semaglutide with L carnitine 55 units (1.51 mg) once week once a week from [facility] due a cost.  She has stool urgency after eating with a soft stool.   She is off metformin.  She has cramping which is relieved with the BM.  No nausea or vomiting.  Weight is stable.      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 NO bald spots.   Hair is still thin.      Yoga has helped her plantar fasciitis.  No foot pain.       She has a CPAP machine nightly all night - She sleeps about 7 hours at night.  Occasionally may take off in the middle of the night. She has donated blood twice since our last visit. Last donation was 3 weeks ago.        She has been taking amlodipine 10 mg daily in the evening.  No leg swelling, chest pain, shortness of breath.      She will take diazepam 5 mg at bedtime if she knows she cannot sleep (rare).      She is on Lexapro 5 mg daily which is helping with stressors in life.  Running household, 2 children (one with type 1 diabetes, one with developmental disability), 2 jobs.       No periods - last one period 2022 - spotting lasted 3 days. No hot fashes or night sweats.  She runs hotter. Hair has been getting thinner the last several years.      She may have passed a kidney stone one night in the middle of the night.  Had a dull pain on the right upper quadrant.  She got up and moved around and the pain improved. The second episode lasted a few hours and she vomited     She had a colonoscopy at [facility] 5 years  ago with robyn [facility] group - Alex Chang.       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              Past Medical History:   Diagnosis Date    Abnormal Pap smear of cervix 2019     colpo benign    Gestational hypertension      HTN (hypertension)                 Past Surgical History:   Procedure Laterality Date    acl replacement Right       x 2    ANTERIOR CRUCIATE LIGAMENT REPAIR Right       SECTION         2    COLPOSCOPY   2019     Stephani Noriega MD    DILATION AND CURETTAGE OF UTERUS        UTERINE FIBROID EMBOLIZATION          Social History                Socioeconomic History    Marital status:        Spouse name: Not on file    Number of children: Not on file    Years of education: Not on file    Highest education level: Not on file   Occupational History    Not on file   Social Needs    Financial resource strain: Hard    Food insecurity:       Worry: Sometimes true       Inability: Sometimes true    Transportation needs:       Medical: No       Non-medical: No   Tobacco Use    Smoking status: Former Smoker       Packs/day: 1.00       Years: 19.00       Pack years: 19.00       Last attempt to quit: 2006       Years since quittin.1    Smokeless tobacco: Never Used   Substance and Sexual Activity    Alcohol use: Yes       Alcohol/week: 4.0 standard drinks       Types: 4 Glasses of wine per week       Comment: one drink 5 times a week    Drug use: No    Sexual activity: Yes       Partners: Male       Comment: , 2 kids   Lifestyle    Physical activity:       Days per week: 2 days       Minutes per session: 40 min    Stress: Very much   Relationships    Social connections:  "      Talks on phone: Three times a week       Gets together: Once a week       Attends [employer] service: Not on file       Active member of club or organization: Yes       Attends meetings of clubs or organizations: More than 4 times per year       Relationship status:    Other Topics Concern    Not on file   Social History Narrative     Lives with  and children. Son with DM1, daughter with chromasomal disorder.     Working with  on increasing exercise     Generally very healthy diet, low-carb     Adopted                     Family Status   Relation Name Status    Mother Rosemary Aguilar Alive    Father Curtis Trotter Alive    Brother half sib Alive    Son Vikas Alive    Sister half sib Alive    MGM Eden Aguilar (Not Specified)    PGM Corine Trotter (Not Specified)    PGF Bubba Trotter (Not Specified)    Daughter Amanda Alive    Pat Uncle Alejandro Trotter (Not Specified)            Meds and allergies: updated on EPIC         /76 (BP Location: Left arm, Patient Position: Sitting)   Pulse 68   Ht 5' 2" (1.575 m)   Wt 92.6 kg (204 lb 2.3 oz)   SpO2 97%   BMI 37.34 kg/m²     General: alert, oriented x 3, no apparent distress.  Affect normal  HEENT: Conjunctivae: anicteric, PERRL, EOMI, TM clear, Oralpharynx clear  Neck: supple, no thyroid enlargement, no cervical lymphadenopathy  Resp: effort normal, lungs clear bilaterally  CV: Regular rate and rhythm without murmurs, gallops or rubs, no lower extremity edema,            Labs 3/24/25 reviewed     Assessment/Plan:             1. Hypertension - controlled  2. Diabetes . Semaglutide once weekly.   Add pioglitazone 15 mg daily.      3. Mood disorder-   on lexapro  4. Insomnia and restless legs- better  5. Obesity - discussed diet and exercise. Semiglutide  6. Vitamin D deficiency - vitamin D 2000 units daily    7. Erythrocytosis -wearing cpap machine more consistently.  Will donate blood regularly.   8. 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 RUQ pain - Abdominal US        MMG   at diagnostic imaging through Dr Noriega- 2/2/24     Saw Dr Noriega 3/24/25     Eye exam - Nicholas Benton on [address] January 2023     colonoscopy 3/6/23 - normal - given 10 years. Alex Chang.      I will see her back in 4 months, Sooner if problems arise